# Patient Record
Sex: FEMALE | Race: WHITE | NOT HISPANIC OR LATINO | Employment: UNEMPLOYED | URBAN - METROPOLITAN AREA
[De-identification: names, ages, dates, MRNs, and addresses within clinical notes are randomized per-mention and may not be internally consistent; named-entity substitution may affect disease eponyms.]

---

## 2022-01-31 ENCOUNTER — HOSPITAL ENCOUNTER (EMERGENCY)
Facility: HOSPITAL | Age: 57
Discharge: HOME | End: 2022-01-31
Attending: EMERGENCY MEDICINE
Payer: MEDICAID

## 2022-01-31 VITALS
DIASTOLIC BLOOD PRESSURE: 89 MMHG | OXYGEN SATURATION: 96 % | WEIGHT: 224.31 LBS | RESPIRATION RATE: 16 BRPM | HEIGHT: 68 IN | TEMPERATURE: 98 F | BODY MASS INDEX: 34 KG/M2 | HEART RATE: 82 BPM | SYSTOLIC BLOOD PRESSURE: 160 MMHG

## 2022-01-31 DIAGNOSIS — K59.01 SLOW TRANSIT CONSTIPATION: ICD-10-CM

## 2022-01-31 DIAGNOSIS — R10.84 GENERALIZED ABDOMINAL PAIN: Primary | ICD-10-CM

## 2022-01-31 LAB
ALBUMIN SERPL BCP-MCNC: 4.4 G/DL (ref 3.5–5.2)
ALP SERPL-CCNC: 90 U/L (ref 55–135)
ALT SERPL W/O P-5'-P-CCNC: 12 U/L (ref 10–44)
ANION GAP SERPL CALC-SCNC: 14 MMOL/L (ref 8–16)
AST SERPL-CCNC: 19 U/L (ref 10–40)
BASOPHILS # BLD AUTO: 0.1 K/UL (ref 0–0.2)
BASOPHILS NFR BLD: 1.3 % (ref 0–1.9)
BILIRUB SERPL-MCNC: 0.5 MG/DL (ref 0.1–1)
BILIRUB UR QL STRIP: NEGATIVE
BUN SERPL-MCNC: 13 MG/DL (ref 6–20)
CALCIUM SERPL-MCNC: 9.8 MG/DL (ref 8.7–10.5)
CHLORIDE SERPL-SCNC: 106 MMOL/L (ref 95–110)
CLARITY UR REFRACT.AUTO: CLEAR
CO2 SERPL-SCNC: 20 MMOL/L (ref 23–29)
COLOR UR AUTO: YELLOW
CREAT SERPL-MCNC: 0.8 MG/DL (ref 0.5–1.4)
DIFFERENTIAL METHOD: ABNORMAL
EOSINOPHIL # BLD AUTO: 0.2 K/UL (ref 0–0.5)
EOSINOPHIL NFR BLD: 3.1 % (ref 0–8)
ERYTHROCYTE [DISTWIDTH] IN BLOOD BY AUTOMATED COUNT: 21.3 % (ref 11.5–14.5)
EST. GFR  (AFRICAN AMERICAN): >60 ML/MIN/1.73 M^2
EST. GFR  (NON AFRICAN AMERICAN): >60 ML/MIN/1.73 M^2
GLUCOSE SERPL-MCNC: 97 MG/DL (ref 70–110)
GLUCOSE UR QL STRIP: NEGATIVE
HCT VFR BLD AUTO: 37.8 % (ref 37–48.5)
HEP C VIRUS HOLD SPECIMEN: NORMAL
HGB BLD-MCNC: 10.2 G/DL (ref 12–16)
HGB UR QL STRIP: NEGATIVE
IMM GRANULOCYTES # BLD AUTO: 0.02 K/UL (ref 0–0.04)
IMM GRANULOCYTES NFR BLD AUTO: 0.3 % (ref 0–0.5)
KETONES UR QL STRIP: NEGATIVE
LEUKOCYTE ESTERASE UR QL STRIP: NEGATIVE
LIPASE SERPL-CCNC: 16 U/L (ref 4–60)
LYMPHOCYTES # BLD AUTO: 1.5 K/UL (ref 1–4.8)
LYMPHOCYTES NFR BLD: 19.4 % (ref 18–48)
MCH RBC QN AUTO: 16.7 PG (ref 27–31)
MCHC RBC AUTO-ENTMCNC: 27 G/DL (ref 32–36)
MCV RBC AUTO: 62 FL (ref 82–98)
MONOCYTES # BLD AUTO: 0.5 K/UL (ref 0.3–1)
MONOCYTES NFR BLD: 6.8 % (ref 4–15)
NEUTROPHILS # BLD AUTO: 5.3 K/UL (ref 1.8–7.7)
NEUTROPHILS NFR BLD: 69.1 % (ref 38–73)
NITRITE UR QL STRIP: NEGATIVE
NRBC BLD-RTO: 0 /100 WBC
PH UR STRIP: 5 [PH] (ref 5–8)
PLATELET # BLD AUTO: 230 K/UL (ref 150–450)
PMV BLD AUTO: ABNORMAL FL (ref 9.2–12.9)
POTASSIUM SERPL-SCNC: 3.8 MMOL/L (ref 3.5–5.1)
PROT SERPL-MCNC: 7.3 G/DL (ref 6–8.4)
PROT UR QL STRIP: NEGATIVE
RBC # BLD AUTO: 6.11 M/UL (ref 4–5.4)
SODIUM SERPL-SCNC: 140 MMOL/L (ref 136–145)
SP GR UR STRIP: <=1.005 (ref 1–1.03)
URN SPEC COLLECT METH UR: ABNORMAL
UROBILINOGEN UR STRIP-ACNC: NEGATIVE EU/DL
WBC # BLD AUTO: 7.68 K/UL (ref 3.9–12.7)

## 2022-01-31 PROCEDURE — 83690 ASSAY OF LIPASE: CPT | Mod: ER | Performed by: EMERGENCY MEDICINE

## 2022-01-31 PROCEDURE — 96374 THER/PROPH/DIAG INJ IV PUSH: CPT | Mod: 59,ER

## 2022-01-31 PROCEDURE — 80053 COMPREHEN METABOLIC PANEL: CPT | Mod: ER | Performed by: EMERGENCY MEDICINE

## 2022-01-31 PROCEDURE — 85025 COMPLETE CBC W/AUTO DIFF WBC: CPT | Mod: ER | Performed by: EMERGENCY MEDICINE

## 2022-01-31 PROCEDURE — 86803 HEPATITIS C AB TEST: CPT | Performed by: EMERGENCY MEDICINE

## 2022-01-31 PROCEDURE — 99285 EMERGENCY DEPT VISIT HI MDM: CPT | Mod: 25,ER

## 2022-01-31 PROCEDURE — 63600175 PHARM REV CODE 636 W HCPCS: Mod: ER | Performed by: EMERGENCY MEDICINE

## 2022-01-31 PROCEDURE — 25500020 PHARM REV CODE 255: Mod: ER | Performed by: EMERGENCY MEDICINE

## 2022-01-31 PROCEDURE — 81003 URINALYSIS AUTO W/O SCOPE: CPT | Mod: ER | Performed by: EMERGENCY MEDICINE

## 2022-01-31 PROCEDURE — 87389 HIV-1 AG W/HIV-1&-2 AB AG IA: CPT | Performed by: EMERGENCY MEDICINE

## 2022-01-31 PROCEDURE — 96361 HYDRATE IV INFUSION ADD-ON: CPT | Mod: ER

## 2022-01-31 RX ORDER — POLYETHYLENE GLYCOL 3350 17 G/17G
POWDER, FOR SOLUTION ORAL
COMMUNITY
End: 2022-04-19

## 2022-01-31 RX ORDER — HYOSCYAMINE SULFATE 0.12 MG/1
0.12 TABLET SUBLINGUAL EVERY 6 HOURS PRN
Qty: 12 TABLET | Refills: 0 | Status: SHIPPED | OUTPATIENT
Start: 2022-01-31 | End: 2024-03-11

## 2022-01-31 RX ORDER — ONDANSETRON 2 MG/ML
4 INJECTION INTRAMUSCULAR; INTRAVENOUS
Status: COMPLETED | OUTPATIENT
Start: 2022-01-31 | End: 2022-01-31

## 2022-01-31 RX ORDER — LACTULOSE 10 G/15ML
10 SOLUTION ORAL 2 TIMES DAILY
Qty: 60 ML | Refills: 2 | Status: SHIPPED | OUTPATIENT
Start: 2022-01-31 | End: 2022-02-04 | Stop reason: ALTCHOICE

## 2022-01-31 RX ORDER — SYRING-NEEDL,DISP,INSUL,0.3 ML 29 G X1/2"
296 SYRINGE, EMPTY DISPOSABLE MISCELLANEOUS ONCE
COMMUNITY
End: 2022-04-19

## 2022-01-31 RX ADMIN — ONDANSETRON 4 MG: 2 INJECTION INTRAMUSCULAR; INTRAVENOUS at 12:01

## 2022-01-31 RX ADMIN — IOHEXOL 75 ML: 350 INJECTION, SOLUTION INTRAVENOUS at 02:01

## 2022-01-31 RX ADMIN — SODIUM CHLORIDE, SODIUM LACTATE, POTASSIUM CHLORIDE, AND CALCIUM CHLORIDE 1000 ML: .6; .31; .03; .02 INJECTION, SOLUTION INTRAVENOUS at 12:01

## 2022-02-01 ENCOUNTER — PES CALL (OUTPATIENT)
Dept: ADMINISTRATIVE | Facility: CLINIC | Age: 57
End: 2022-02-01
Payer: MEDICAID

## 2022-02-01 LAB — HCV AB SERPL QL IA: NEGATIVE

## 2022-02-01 NOTE — ED PROVIDER NOTES
Encounter Date: 1/31/2022       History     Chief Complaint   Patient presents with    Constipation     Pt present to ED with concerns of being constipated, onset 1 week ago , worsen, pt has been taking OTC meds and enema with no relief      Chief complaint: Constipation    History of present illness: 57 y/o female with c/o constipation and generalized abdominal discomfort for 1 week. Severity increasing from mild to moderate over the last few days. No relief with multiple doses of Mag Citrate, Dulcolax. No rectal bleeding or dark stool . Previously had some green soft stool but no BRB. No mucus. No vomiting but some nausea. Enemas tried with no relief as well. No apparent aggravating or mitigating factors.        Review of patient's allergies indicates:  No Known Allergies  History reviewed. No pertinent past medical history.  Past Surgical History:   Procedure Laterality Date    TUBAL LIGATION       History reviewed. No pertinent family history.  Social History     Tobacco Use    Smoking status: Current Every Day Smoker     Types: Cigarettes    Smokeless tobacco: Never Used   Substance Use Topics    Alcohol use: Yes    Drug use: Not Currently     Types: Marijuana, Methamphetamines     Review of Systems   Constitutional: Negative for activity change, appetite change, chills and fever.   HENT: Negative.    Eyes: Negative.    Respiratory: Negative.  Negative for shortness of breath.    Cardiovascular: Negative.  Negative for leg swelling.   Gastrointestinal: Positive for abdominal pain, constipation and nausea. Negative for abdominal distention, blood in stool, diarrhea, rectal pain and vomiting.   Genitourinary: Negative.    Musculoskeletal: Negative.    Neurological: Negative.    Psychiatric/Behavioral: Negative.    All other systems reviewed and are negative.      Physical Exam     Initial Vitals [01/31/22 1102]   BP Pulse Resp Temp SpO2   (!) 160/89 82 18 98 °F (36.7 °C) 96 %      MAP       --          Physical Exam    Nursing note and vitals reviewed.  Constitutional: She appears well-nourished. She is not diaphoretic. No distress.   HENT:   Head: Normocephalic and atraumatic.   Right Ear: External ear normal.   Left Ear: External ear normal.   Mouth/Throat: Oropharynx is clear and moist.   Eyes: Conjunctivae and EOM are normal. No scleral icterus.   Neck: Neck supple.   Normal range of motion.  Cardiovascular: Normal rate, regular rhythm and intact distal pulses.   Pulmonary/Chest: No respiratory distress.   Abdominal: Abdomen is soft. She exhibits no distension. There is abdominal tenderness (Mild diffuse). There is guarding. There is no rebound.   Musculoskeletal:         General: Normal range of motion.      Cervical back: Normal range of motion and neck supple.     Neurological: She is alert and oriented to person, place, and time. She has normal strength. GCS score is 15. GCS eye subscore is 4. GCS verbal subscore is 5. GCS motor subscore is 6.   Skin: Skin is warm and dry.   Psychiatric: She has a normal mood and affect. Her behavior is normal. Judgment and thought content normal.         ED Course   Procedures  Labs Reviewed   CBC W/ AUTO DIFFERENTIAL - Abnormal; Notable for the following components:       Result Value    RBC 6.11 (*)     Hemoglobin 10.2 (*)     MCV 62 (*)     MCH 16.7 (*)     MCHC 27.0 (*)     RDW 21.3 (*)     All other components within normal limits   COMPREHENSIVE METABOLIC PANEL - Abnormal; Notable for the following components:    CO2 20 (*)     All other components within normal limits   URINALYSIS, REFLEX TO URINE CULTURE - Abnormal; Notable for the following components:    Specific Gravity, UA <=1.005 (*)     All other components within normal limits    Narrative:     Specimen Source->Urine   HEPATITIS C ANTIBODY    Narrative:     Release to patient->Immediate   HEP C VIRUS HOLD SPECIMEN    Narrative:     Release to patient->Immediate   LIPASE   HIV 1 / 2 ANTIBODY           Imaging Results          CT Abdomen Pelvis With Contrast (Final result)  Result time 01/31/22 14:10:39    Final result by Thaddeus Martin MD (01/31/22 14:10:39)                 Impression:      Few scattered nonspecific air-fluid levels.  Otherwise negative except for a simple hepatic cyst.      Electronically signed by: Thaddeus Martin MD  Date:    01/31/2022  Time:    14:10             Narrative:    EXAMINATION:  CT ABDOMEN PELVIS WITH CONTRAST    CLINICAL HISTORY:  Acute abdominal pain.    TECHNIQUE:  Standard contrast enhanced CT performed with IV 75 ml Omnipaque 350.  with reformats.  All CT scans at this facility are performed  using dose modulation techniques as appropriate to performed exam including the following:  automated exposure control; adjustment of mA and/or kV according to the patients size (this includes techniques or standardized protocols for targeted exams where dose is matched to indication/reason for exam: i.e. extremities or head);  iterative reconstruction technique.    COMPARISON:  None    FINDINGS:  Lung bases are clear.    The liver is nonenlarged.  There is at least 1 simple cyst.  The gallbladder is present.    The spleen is nonenlarged.  Small accessory splenule is present.    Kidneys and adrenal glands are normal.    The abdominal aorta, IVC and pancreas appear normal.    The bowel loops are nondilated.  There are however a few scattered air-fluid levels.  The appendix is normal.    Within the pelvis the uterus is present    Several calcifications are noted most consistent with phleboliths.    Mild lumbar degenerative disc disease notably at L5-S1.    No free fluid, masses or inflammation.                                 Medications   lactated ringers bolus 1,000 mL (0 mLs Intravenous Stopped 1/31/22 1348)   ondansetron injection 4 mg (4 mg Intravenous Given 1/31/22 1252)   iohexoL (OMNIPAQUE 350) injection 75 mL (75 mLs Intravenous Given 1/31/22 1400)     Medical Decision  Making:   Clinical Tests:   Lab Tests: Ordered and Reviewed  Radiological Study: Ordered and Reviewed  ED Management:  The patient has constipation unrelenting for a week. Linzess also part of regimen that has not worked over the period. Diffuse tenderness suggests investigating abdominal contents on CT to determine any degree of constipation or other obstructive pathology in the large bowel.  CT negative for SBO or stool over-load. Few air/fluid levels in large bowel of incidental finding.   SAdvised will try Latulose on prn basis next 48 hours but to return if symptoms worsen. Pt and partner understand and will comply if necessary.  Suggests slow transit as cause as opposed to excess stool burden.                      Clinical Impression:   Final diagnoses:  [R10.84] Generalized abdominal pain (Primary)  [K59.01] Slow transit constipation          ED Disposition Condition    Discharge Stable        ED Prescriptions     Medication Sig Dispense Start Date End Date Auth. Provider    lactulose (CHRONULAC) 20 gram/30 mL Soln Take 15 mLs (10 g total) by mouth 2 (two) times daily. For constipation. 60 mL 1/31/2022  Carlos Vasquez MD    hyoscyamine (LEVSIN/SL) 0.125 mg Subl Place 1 tablet (0.125 mg total) under the tongue every 6 (six) hours as needed (abdominal discomfort). 12 tablet 1/31/2022  Carlos Vasquez MD        Follow-up Information     Follow up With Specialties Details Why Contact Info    [facility] Emergency Dept Emergency Medicine  If symptoms worsen [address]  [facility] [id number] [phone]           Carlos Vasquez MD  02/01/22 0025

## 2022-02-02 ENCOUNTER — HOSPITAL ENCOUNTER (EMERGENCY)
Facility: HOSPITAL | Age: 57
Discharge: HOME | End: 2022-02-02
Attending: EMERGENCY MEDICINE
Payer: MEDICAID

## 2022-02-02 ENCOUNTER — NURSE TRIAGE (OUTPATIENT)
Dept: ADMINISTRATIVE | Facility: CLINIC | Age: 57
End: 2022-02-02
Payer: MEDICAID

## 2022-02-02 VITALS
TEMPERATURE: 98 F | WEIGHT: 227.06 LBS | HEART RATE: 66 BPM | BODY MASS INDEX: 34.53 KG/M2 | OXYGEN SATURATION: 98 % | SYSTOLIC BLOOD PRESSURE: 161 MMHG | RESPIRATION RATE: 18 BRPM | DIASTOLIC BLOOD PRESSURE: 79 MMHG

## 2022-02-02 DIAGNOSIS — K59.00 CONSTIPATION: ICD-10-CM

## 2022-02-02 LAB — HIV 1+2 AB+HIV1 P24 AG SERPL QL IA: NEGATIVE

## 2022-02-02 PROCEDURE — 99283 EMERGENCY DEPT VISIT LOW MDM: CPT | Mod: 25,ER

## 2022-02-02 NOTE — TELEPHONE ENCOUNTER
Has been 10 days without a BM. Seen in the ED on 1/31. Taking as ordered, Lactulose no results, stomach is rumbling. Prunes and fiber, apple juice, everything. No results. Patient is almost crying, very worried. Reassured but advised she would need to go back to the ED per her discharge instructions. Triage done- dispo ED.  Verb understanding. Instructed to call back for any further questions or concerns or worsening S/S.     Reason for Disposition   Patient sounds very sick or weak to the triager    Additional Information   Negative: [1] Abdomen pain is main symptom AND [2] male   Negative: [1] Abdomen pain is main symptom AND [2] adult female   Negative: Rectal bleeding or blood in stool is main symptom   Negative: Rectal pain or itching is main symptom   Negative: Constipation in a cancer patient who is currently (or recently) receiving chemotherapy or radiation therapy, or cancer patient who has metastatic or end-stage cancer and is receiving palliative care    Protocols used: CONSTIPATION-A-

## 2022-02-03 ENCOUNTER — TELEPHONE (OUTPATIENT)
Dept: GASTROENTEROLOGY | Facility: CLINIC | Age: 57
End: 2022-02-03
Payer: MEDICAID

## 2022-02-03 NOTE — TELEPHONE ENCOUNTER
----- Message from Manas Lucio sent at 2/3/2022  8:18 AM CST -----  Contact: self  Type:  Sooner Apoointment Request    Caller is requesting a sooner appointment.  Caller declined first available appointment listed below.  Caller will not accept being placed on the waitlist and is requesting a message be sent to doctor.  Name of Caller:Sheryldami Ortiz  When is the first available appointment?5/2/2022  Symptoms:er follow up  Would the patient rather a call back or a response via [facility]? Call back  Best Call Back Number:[phone]  Additional Information:

## 2022-02-03 NOTE — ED PROVIDER NOTES
Encounter Date: 2/2/2022       History     Chief Complaint   Patient presents with    Constipation     Last BM 12 days ago. Pt came into er and was prescribed lactulose and taken 5 doses with no relief. Lower abdominal discomfort. Pt was laying down ealier and felt the room spinning but that has resolved.      This is an ongoing problem  Chief complaint: Constipation    History of present illness: 57 y/o female with c/o continuing constipation in spite of multiple doses of Lactulose and other laxatives. Abdominal pain has not worsened since visit 2 days ago. Passed about 2 ounces of stool today. No blood or dark stool. No nausea or vomiting and is eating well and baffled as to why no stool. Symptoms about the same as 2 days ago. No aggravating or mitigating factors.        Review of patient's allergies indicates:  No Known Allergies  No past medical history on file.  Past Surgical History:   Procedure Laterality Date    TUBAL LIGATION       No family history on file.  Social History     Tobacco Use    Smoking status: Current Every Day Smoker     Types: Cigarettes    Smokeless tobacco: Never Used   Substance Use Topics    Alcohol use: Yes    Drug use: Not Currently     Types: Marijuana, Methamphetamines     Review of Systems   Constitutional: Negative for activity change, appetite change, fatigue and fever.   HENT: Negative.    Respiratory: Negative.    Gastrointestinal: Positive for abdominal pain and constipation. Negative for abdominal distention, anal bleeding, diarrhea, nausea, rectal pain and vomiting.   Genitourinary: Negative.    Musculoskeletal: Negative.    Neurological: Negative.    Psychiatric/Behavioral: Negative.    All other systems reviewed and are negative.      Physical Exam     Initial Vitals [02/02/22 1909]   BP Pulse Resp Temp SpO2   (!) 169/70 70 18 97.8 °F (36.6 °C) 98 %      MAP       --         Physical Exam    Nursing note and vitals reviewed.  Constitutional: She appears  well-nourished. No distress.   HENT:   Head: Normocephalic and atraumatic.   Right Ear: External ear normal.   Left Ear: External ear normal.   Eyes: Conjunctivae and EOM are normal.   Neck: Neck supple.   Normal range of motion.  Cardiovascular: Normal rate, regular rhythm and intact distal pulses.   Pulmonary/Chest: No respiratory distress.   Abdominal: Abdomen is soft. She exhibits no distension. There is abdominal tenderness (Mild LUQ).   Rectal : Normal sphincter tone (Chaperone present), no fissures or hemorrhoids noted   Musculoskeletal:         General: Normal range of motion.      Cervical back: Normal range of motion and neck supple.     Neurological: She is alert and oriented to person, place, and time. A cranial nerve deficit is present.   Skin: Capillary refill takes less than 2 seconds.   Psychiatric: She has a normal mood and affect. Her behavior is normal.         ED Course   Procedures  Labs Reviewed - No data to display       Imaging Results          X-Ray Abdomen Flat And Erect (Final result)  Result time 02/02/22 21:11:06    Final result by Gunnar Peterson MD (02/02/22 21:11:06)                 Impression:      As above      Electronically signed by: Nicholas Maher  Date:    02/02/2022  Time:    21:11             Narrative:    EXAMINATION:  XR ABDOMEN FLAT AND ERECT    CLINICAL HISTORY:  Constipation, unspecified    TECHNIQUE:  Flat and erect AP views of the abdomen were performed.    COMPARISON:  None    FINDINGS:  Moderate amount of stool in the colon.  No air-fluid levels or distended loops of bowel.  No acute process seen                                   Medications - No data to display  Medical Decision Making:   Clinical Tests:   Radiological Study: Ordered and Reviewed  ED Management:  Flat and erect suggest a moderate stool load but no signs of obstruction. Rectal digital exam is unremarkable but no impaction noted. No signs of acute abdomen clinically or radiologically. Advised to take  enema tonight and be prepared for call from GI clinic, per Dr. Juárez, for further evaluation of the chronic constipation.  Other:   I have discussed this case with another health care provider.       <> Summary of the Discussion: 8:45 p.m. Discussed with Dr. Juárez, GI and will arrange for outpatient evaluation.                      Clinical Impression:        ICD-10-CM ICD-9-CM   1. Constipation  K59.00 564.00               Carlos Vasquez MD  02/03/22 0159

## 2022-02-04 ENCOUNTER — LAB VISIT (OUTPATIENT)
Dept: LAB | Facility: HOSPITAL | Age: 57
End: 2022-02-04
Attending: PHYSICIAN ASSISTANT
Payer: MEDICAID

## 2022-02-04 ENCOUNTER — OFFICE VISIT (OUTPATIENT)
Dept: GASTROENTEROLOGY | Facility: CLINIC | Age: 57
End: 2022-02-04
Payer: MEDICAID

## 2022-02-04 VITALS
SYSTOLIC BLOOD PRESSURE: 126 MMHG | DIASTOLIC BLOOD PRESSURE: 78 MMHG | HEIGHT: 68 IN | HEART RATE: 96 BPM | BODY MASS INDEX: 34.32 KG/M2 | WEIGHT: 226.44 LBS | OXYGEN SATURATION: 99 %

## 2022-02-04 DIAGNOSIS — D64.9 ANEMIA, UNSPECIFIED TYPE: ICD-10-CM

## 2022-02-04 DIAGNOSIS — K59.00 CONSTIPATION, UNSPECIFIED CONSTIPATION TYPE: ICD-10-CM

## 2022-02-04 DIAGNOSIS — R10.12 LUQ PAIN: ICD-10-CM

## 2022-02-04 DIAGNOSIS — K92.1 BLACK STOOLS: Primary | ICD-10-CM

## 2022-02-04 DIAGNOSIS — K92.1 BLACK STOOLS: ICD-10-CM

## 2022-02-04 LAB
BASOPHILS # BLD AUTO: 0.1 K/UL (ref 0–0.2)
BASOPHILS NFR BLD: 1.6 % (ref 0–1.9)
DIFFERENTIAL METHOD: ABNORMAL
EOSINOPHIL # BLD AUTO: 0.4 K/UL (ref 0–0.5)
EOSINOPHIL NFR BLD: 6.9 % (ref 0–8)
ERYTHROCYTE [DISTWIDTH] IN BLOOD BY AUTOMATED COUNT: 21.9 % (ref 11.5–14.5)
HCT VFR BLD AUTO: 37.5 % (ref 37–48.5)
HGB BLD-MCNC: 9.7 G/DL (ref 12–16)
IMM GRANULOCYTES # BLD AUTO: 0.02 K/UL (ref 0–0.04)
IMM GRANULOCYTES NFR BLD AUTO: 0.3 % (ref 0–0.5)
LYMPHOCYTES # BLD AUTO: 1.7 K/UL (ref 1–4.8)
LYMPHOCYTES NFR BLD: 26.8 % (ref 18–48)
MCH RBC QN AUTO: 16.8 PG (ref 27–31)
MCHC RBC AUTO-ENTMCNC: 25.9 G/DL (ref 32–36)
MCV RBC AUTO: 65 FL (ref 82–98)
MONOCYTES # BLD AUTO: 0.5 K/UL (ref 0.3–1)
MONOCYTES NFR BLD: 7.7 % (ref 4–15)
NEUTROPHILS # BLD AUTO: 3.6 K/UL (ref 1.8–7.7)
NEUTROPHILS NFR BLD: 56.7 % (ref 38–73)
NRBC BLD-RTO: 0 /100 WBC
PLATELET # BLD AUTO: 224 K/UL (ref 150–450)
PMV BLD AUTO: ABNORMAL FL (ref 9.2–12.9)
RBC # BLD AUTO: 5.78 M/UL (ref 4–5.4)
WBC # BLD AUTO: 6.34 K/UL (ref 3.9–12.7)

## 2022-02-04 PROCEDURE — 99204 OFFICE O/P NEW MOD 45 MIN: CPT | Mod: S$PBB,,, | Performed by: PHYSICIAN ASSISTANT

## 2022-02-04 PROCEDURE — 3008F PR BODY MASS INDEX (BMI) DOCUMENTED: ICD-10-PCS | Mod: CPTII,,, | Performed by: PHYSICIAN ASSISTANT

## 2022-02-04 PROCEDURE — 3078F DIAST BP <80 MM HG: CPT | Mod: CPTII,,, | Performed by: PHYSICIAN ASSISTANT

## 2022-02-04 PROCEDURE — 85025 COMPLETE CBC W/AUTO DIFF WBC: CPT | Performed by: PHYSICIAN ASSISTANT

## 2022-02-04 PROCEDURE — 1159F PR MEDICATION LIST DOCUMENTED IN MEDICAL RECORD: ICD-10-PCS | Mod: CPTII,,, | Performed by: PHYSICIAN ASSISTANT

## 2022-02-04 PROCEDURE — 3008F BODY MASS INDEX DOCD: CPT | Mod: CPTII,,, | Performed by: PHYSICIAN ASSISTANT

## 2022-02-04 PROCEDURE — 99999 PR PBB SHADOW E&M-EST. PATIENT-LVL III: ICD-10-PCS | Mod: PBBFAC,,, | Performed by: PHYSICIAN ASSISTANT

## 2022-02-04 PROCEDURE — 36415 COLL VENOUS BLD VENIPUNCTURE: CPT | Performed by: PHYSICIAN ASSISTANT

## 2022-02-04 PROCEDURE — 99213 OFFICE O/P EST LOW 20 MIN: CPT | Mod: PBBFAC | Performed by: PHYSICIAN ASSISTANT

## 2022-02-04 PROCEDURE — 3074F SYST BP LT 130 MM HG: CPT | Mod: CPTII,,, | Performed by: PHYSICIAN ASSISTANT

## 2022-02-04 PROCEDURE — 99204 PR OFFICE/OUTPT VISIT, NEW, LEVL IV, 45-59 MIN: ICD-10-PCS | Mod: S$PBB,,, | Performed by: PHYSICIAN ASSISTANT

## 2022-02-04 PROCEDURE — 99999 PR PBB SHADOW E&M-EST. PATIENT-LVL III: CPT | Mod: PBBFAC,,, | Performed by: PHYSICIAN ASSISTANT

## 2022-02-04 PROCEDURE — 1159F MED LIST DOCD IN RCRD: CPT | Mod: CPTII,,, | Performed by: PHYSICIAN ASSISTANT

## 2022-02-04 PROCEDURE — 3078F PR MOST RECENT DIASTOLIC BLOOD PRESSURE < 80 MM HG: ICD-10-PCS | Mod: CPTII,,, | Performed by: PHYSICIAN ASSISTANT

## 2022-02-04 PROCEDURE — 3074F PR MOST RECENT SYSTOLIC BLOOD PRESSURE < 130 MM HG: ICD-10-PCS | Mod: CPTII,,, | Performed by: PHYSICIAN ASSISTANT

## 2022-02-04 RX ORDER — SOD SULF/POT CHLORIDE/MAG SULF 1.479 G
12 TABLET ORAL DAILY
Qty: 24 TABLET | Refills: 0 | Status: SHIPPED | OUTPATIENT
Start: 2022-02-04 | End: 2022-04-19

## 2022-02-04 RX ORDER — PANTOPRAZOLE SODIUM 40 MG/1
40 TABLET, DELAYED RELEASE ORAL DAILY
Qty: 30 TABLET | Refills: 11 | Status: SHIPPED | OUTPATIENT
Start: 2022-02-04 | End: 2022-02-28 | Stop reason: SDUPTHER

## 2022-02-04 NOTE — PROGRESS NOTES
Clinic Consult:  [facility] Consultation Note    Reason for Consult:  The primary encounter diagnosis was Black stools. Diagnoses of LUQ pain, Constipation, unspecified constipation type, and Anemia, unspecified type were also pertinent to this visit.    PCP: Primary Doctor No   [id number] / [address] [id number]    CC: Constipation      HPI:  This is a 56 y.o. female here for evaluation of the above. Reports she began with constipation in the middle of January. She was tried taking Miralax, dulcolax, Linzess, and Magnesium citrate with no movement. Reported to the ED and was given Lactulose and Levsin. Reported back to the ED a few days ago for same symptoms. She finally got some bowel movement with enema and dulcolax yesterday. Denies any blood in the stool. Main complaint is pain to LUQ deep under her ribs. Worse with eating, especially meat. Notes black stools for a few days in a row a couple of weeks ago. Labs in the ED showed an anemia with a hemoglobin of 10.2 but no additional labs to compare this to. CT completed with no acute abnormalities.  Mother with colitis history - no CRC.  Not taking iron or pepto bismol.      Review of Systems   Constitutional: Negative for activity change, appetite change, chills, diaphoresis, fatigue, fever and unexpected weight change.   HENT: Negative for trouble swallowing.    Respiratory: Negative for cough and shortness of breath.    Cardiovascular: Negative for chest pain.   Gastrointestinal: Positive for abdominal pain and constipation. Negative for abdominal distention, anal bleeding, blood in stool, nausea and vomiting.   Genitourinary: Negative for dysuria and hematuria.   Skin: Negative for color change and pallor.   Neurological: Negative for dizziness, weakness and light-headedness.   Psychiatric/Behavioral: Negative for dysphoric mood. The patient is nervous/anxious.         Medical History:   No past medical history on file.    Surgical  "History:   Past Surgical History:   Procedure Laterality Date    TUBAL LIGATION         Family History:    No family history on file.    Social History:   Social History     Socioeconomic History    Marital status:    Tobacco Use    Smoking status: Current Every Day Smoker     Types: Cigarettes    Smokeless tobacco: Never Used   Substance and Sexual Activity    Alcohol use: Not Currently    Drug use: Not Currently     Types: Marijuana, Methamphetamines    Sexual activity: Not Currently       Allergies:   Review of patient's allergies indicates:  No Known Allergies    Home Medications:   Current Outpatient Medications on File Prior to Visit   Medication Sig Dispense Refill    hyoscyamine (LEVSIN/SL) 0.125 mg Subl Place 1 tablet (0.125 mg total) under the tongue every 6 (six) hours as needed (abdominal discomfort). 12 tablet 0    magnesium citrate solution Take 296 mLs by mouth once.      polyethylene glycol (GLYCOLAX) 17 gram PwPk Take by mouth.      [DISCONTINUED] lactulose (CHRONULAC) 20 gram/30 mL Soln Take 15 mLs (10 g total) by mouth 2 (two) times daily. For constipation. (Patient not taking: Reported on 2/4/2022) 60 mL 2    [DISCONTINUED] linaclotide (LINZESS ORAL) Take by mouth.       No current facility-administered medications on file prior to visit.       Physical Exam:  /78   Pulse 96   Ht 5' 8" (1.727 m)   Wt 102.7 kg (226 lb 6.6 oz)   LMP  (LMP Unknown)   SpO2 99%   BMI 34.43 kg/m²   Body mass index is 34.43 kg/m².  Physical Exam  Constitutional:       General: She is not in acute distress.     Appearance: Normal appearance. She is not ill-appearing, toxic-appearing or diaphoretic.   HENT:      Head: Normocephalic and atraumatic.   Eyes:      General: No scleral icterus.     Extraocular Movements: Extraocular movements intact.   Cardiovascular:      Rate and Rhythm: Normal rate and regular rhythm.   Pulmonary:      Effort: Pulmonary effort is normal. No respiratory " distress.   Abdominal:      General: Bowel sounds are normal. There is no distension.      Palpations: Abdomen is soft. There is no mass.      Tenderness: There is abdominal tenderness (LUQ). There is no guarding.   Musculoskeletal:         General: Normal range of motion.      Cervical back: Normal range of motion.   Skin:     General: Skin is warm and dry.      Coloration: Skin is not jaundiced or pale.   Neurological:      General: No focal deficit present.      Mental Status: She is alert and oriented to person, place, and time.   Psychiatric:         Mood and Affect: Mood normal.         Behavior: Behavior normal.         Thought Content: Thought content normal.           Labs: Pertinent labs reviewed.  Endoscopy: none  CRC Screening: none  Anticoagulation: none    Assessment:  1. Black stools    2. LUQ pain    3. Constipation, unspecified constipation type    4. Anemia, unspecified type         Recommendations:  -Schedule for colonoscopy (add EGD due to LUQ pain and black stools in addition to anemia).   -CBC today   -Would like for her to start on Linzess 145mcg daily. May need to increase to 290.  -Extended prep for colonoscopy in Wrap Up.  -SuTab    Black stools  -     pantoprazole (PROTONIX) 40 MG tablet; Take 1 tablet (40 mg total) by mouth once daily.  Dispense: 30 tablet; Refill: 11  -     CBC Auto Differential; Future; Expected date: 02/04/2022    LUQ pain  -     pantoprazole (PROTONIX) 40 MG tablet; Take 1 tablet (40 mg total) by mouth once daily.  Dispense: 30 tablet; Refill: 11  -     linaCLOtide (LINZESS) 145 mcg Cap capsule; Take 1 capsule (145 mcg total) by mouth before breakfast.  Dispense: 30 capsule; Refill: 2    Constipation, unspecified constipation type  -     Case Request Endoscopy: COLONOSCOPY  -     sod sulf-pot chloride-mag sulf (SUTAB) 1.479-0.188- 0.225 gram tablet; Take 12 tablets by mouth once daily. Take according to package instructions with indicated amount of water.  Dispense:  24 tablet; Refill: 0  -     linaCLOtide (LINZESS) 145 mcg Cap capsule; Take 1 capsule (145 mcg total) by mouth before breakfast.  Dispense: 30 capsule; Refill: 2    Anemia, unspecified type  -     Case Request Endoscopy: COLONOSCOPY  -     sod sulf-pot chloride-mag sulf (SUTAB) 1.479-0.188- 0.225 gram tablet; Take 12 tablets by mouth once daily. Take according to package instructions with indicated amount of water.  Dispense: 24 tablet; Refill: 0        No follow-ups on file.    Thank you so much for allowing me to participate in the care of [facility]  Roxi Sanchez PA-C

## 2022-02-04 NOTE — PATIENT INSTRUCTIONS
Extended prep for colonoscopy:  -Miralax daily starting 1 week before colonoscopy.  -Clear liquid diet for 2 days prior to colonoscopy  -one enema and a bottle of magnesium citrate 2 days prior to colonoscopy  -Normal prep starting 1 day prior to colonoscopy.

## 2022-02-04 NOTE — H&P (VIEW-ONLY)
Clinic Consult:  [facility] Consultation Note    Reason for Consult:  The primary encounter diagnosis was Black stools. Diagnoses of LUQ pain, Constipation, unspecified constipation type, and Anemia, unspecified type were also pertinent to this visit.    PCP: Primary Doctor No   [id number] / [address] [id number]    CC: Constipation      HPI:  This is a 56 y.o. female here for evaluation of the above. Reports she began with constipation in the middle of January. She was tried taking Miralax, dulcolax, Linzess, and Magnesium citrate with no movement. Reported to the ED and was given Lactulose and Levsin. Reported back to the ED a few days ago for same symptoms. She finally got some bowel movement with enema and dulcolax yesterday. Denies any blood in the stool. Main complaint is pain to LUQ deep under her ribs. Worse with eating, especially meat. Notes black stools for a few days in a row a couple of weeks ago. Labs in the ED showed an anemia with a hemoglobin of 10.2 but no additional labs to compare this to. CT completed with no acute abnormalities.  Mother with colitis history - no CRC.  Not taking iron or pepto bismol.      Review of Systems   Constitutional: Negative for activity change, appetite change, chills, diaphoresis, fatigue, fever and unexpected weight change.   HENT: Negative for trouble swallowing.    Respiratory: Negative for cough and shortness of breath.    Cardiovascular: Negative for chest pain.   Gastrointestinal: Positive for abdominal pain and constipation. Negative for abdominal distention, anal bleeding, blood in stool, nausea and vomiting.   Genitourinary: Negative for dysuria and hematuria.   Skin: Negative for color change and pallor.   Neurological: Negative for dizziness, weakness and light-headedness.   Psychiatric/Behavioral: Negative for dysphoric mood. The patient is nervous/anxious.         Medical History:   No past medical history on file.    Surgical  "History:   Past Surgical History:   Procedure Laterality Date    TUBAL LIGATION         Family History:    No family history on file.    Social History:   Social History     Socioeconomic History    Marital status:    Tobacco Use    Smoking status: Current Every Day Smoker     Types: Cigarettes    Smokeless tobacco: Never Used   Substance and Sexual Activity    Alcohol use: Not Currently    Drug use: Not Currently     Types: Marijuana, Methamphetamines    Sexual activity: Not Currently       Allergies:   Review of patient's allergies indicates:  No Known Allergies    Home Medications:   Current Outpatient Medications on File Prior to Visit   Medication Sig Dispense Refill    hyoscyamine (LEVSIN/SL) 0.125 mg Subl Place 1 tablet (0.125 mg total) under the tongue every 6 (six) hours as needed (abdominal discomfort). 12 tablet 0    magnesium citrate solution Take 296 mLs by mouth once.      polyethylene glycol (GLYCOLAX) 17 gram PwPk Take by mouth.      [DISCONTINUED] lactulose (CHRONULAC) 20 gram/30 mL Soln Take 15 mLs (10 g total) by mouth 2 (two) times daily. For constipation. (Patient not taking: Reported on 2/4/2022) 60 mL 2    [DISCONTINUED] linaclotide (LINZESS ORAL) Take by mouth.       No current facility-administered medications on file prior to visit.       Physical Exam:  /78   Pulse 96   Ht 5' 8" (1.727 m)   Wt 102.7 kg (226 lb 6.6 oz)   LMP  (LMP Unknown)   SpO2 99%   BMI 34.43 kg/m²   Body mass index is 34.43 kg/m².  Physical Exam  Constitutional:       General: She is not in acute distress.     Appearance: Normal appearance. She is not ill-appearing, toxic-appearing or diaphoretic.   HENT:      Head: Normocephalic and atraumatic.   Eyes:      General: No scleral icterus.     Extraocular Movements: Extraocular movements intact.   Cardiovascular:      Rate and Rhythm: Normal rate and regular rhythm.   Pulmonary:      Effort: Pulmonary effort is normal. No respiratory " distress.   Abdominal:      General: Bowel sounds are normal. There is no distension.      Palpations: Abdomen is soft. There is no mass.      Tenderness: There is abdominal tenderness (LUQ). There is no guarding.   Musculoskeletal:         General: Normal range of motion.      Cervical back: Normal range of motion.   Skin:     General: Skin is warm and dry.      Coloration: Skin is not jaundiced or pale.   Neurological:      General: No focal deficit present.      Mental Status: She is alert and oriented to person, place, and time.   Psychiatric:         Mood and Affect: Mood normal.         Behavior: Behavior normal.         Thought Content: Thought content normal.           Labs: Pertinent labs reviewed.  Endoscopy: none  CRC Screening: none  Anticoagulation: none    Assessment:  1. Black stools    2. LUQ pain    3. Constipation, unspecified constipation type    4. Anemia, unspecified type         Recommendations:  -Schedule for colonoscopy (add EGD due to LUQ pain and black stools in addition to anemia).   -CBC today   -Would like for her to start on Linzess 145mcg daily. May need to increase to 290.  -Extended prep for colonoscopy in Wrap Up.  -SuTab    Black stools  -     pantoprazole (PROTONIX) 40 MG tablet; Take 1 tablet (40 mg total) by mouth once daily.  Dispense: 30 tablet; Refill: 11  -     CBC Auto Differential; Future; Expected date: 02/04/2022    LUQ pain  -     pantoprazole (PROTONIX) 40 MG tablet; Take 1 tablet (40 mg total) by mouth once daily.  Dispense: 30 tablet; Refill: 11  -     linaCLOtide (LINZESS) 145 mcg Cap capsule; Take 1 capsule (145 mcg total) by mouth before breakfast.  Dispense: 30 capsule; Refill: 2    Constipation, unspecified constipation type  -     Case Request Endoscopy: COLONOSCOPY  -     sod sulf-pot chloride-mag sulf (SUTAB) 1.479-0.188- 0.225 gram tablet; Take 12 tablets by mouth once daily. Take according to package instructions with indicated amount of water.  Dispense:  24 tablet; Refill: 0  -     linaCLOtide (LINZESS) 145 mcg Cap capsule; Take 1 capsule (145 mcg total) by mouth before breakfast.  Dispense: 30 capsule; Refill: 2    Anemia, unspecified type  -     Case Request Endoscopy: COLONOSCOPY  -     sod sulf-pot chloride-mag sulf (SUTAB) 1.479-0.188- 0.225 gram tablet; Take 12 tablets by mouth once daily. Take according to package instructions with indicated amount of water.  Dispense: 24 tablet; Refill: 0        No follow-ups on file.    Thank you so much for allowing me to participate in the care of [facility]  Roxi Sanchez PA-C

## 2022-02-15 ENCOUNTER — PATIENT MESSAGE (OUTPATIENT)
Dept: GASTROENTEROLOGY | Facility: CLINIC | Age: 57
End: 2022-02-15
Payer: MEDICAID

## 2022-02-15 DIAGNOSIS — K59.00 CONSTIPATION, UNSPECIFIED CONSTIPATION TYPE: Primary | ICD-10-CM

## 2022-02-16 ENCOUNTER — PATIENT MESSAGE (OUTPATIENT)
Dept: GASTROENTEROLOGY | Facility: CLINIC | Age: 57
End: 2022-02-16
Payer: MEDICAID

## 2022-02-16 RX ORDER — LACTULOSE 10 G/15ML
20 SOLUTION ORAL 2 TIMES DAILY
Qty: 1800 ML | Refills: 2 | Status: SHIPPED | OUTPATIENT
Start: 2022-02-16 | End: 2022-04-19

## 2022-02-21 ENCOUNTER — TELEPHONE (OUTPATIENT)
Dept: PREADMISSION TESTING | Facility: HOSPITAL | Age: 57
End: 2022-02-21
Payer: MEDICAID

## 2022-02-21 NOTE — PRE-PROCEDURE INSTRUCTIONS
PAT call completed.  Patient educated on procedure instructions.  Medical history discussed and patient informed of arrival time of 11:30 AM on Wednesday, February 23, 2022 at the [facility], and was made aware of the limited-visitor policy, and that  is to remain during the entire visit.  All questions and concerns addressed.  Endoscopy instructions reviewed. Instructed no solid food, only clear liquids, the day before the procedure, then at 6 PM, the day before the procedure, drink the first half of the bowel prep, then at 6 AM, the morning of procedure, drink the second half of the prep, then do not eat or drink anything until after the procedure.  Rapid pre-procedure covid testing to be performed the morning of procedure.   Patient verbalized understanding of all instructions.

## 2022-02-21 NOTE — TELEPHONE ENCOUNTER
PAT call completed.  Patient educated on procedure instructions.  Medical history discussed and patient informed of arrival time of 11:30 AM on Wednesday, February 23, 2022 at the [facility], and was made aware of the limited-visitor policy, and that  is to remain during the entire visit.  All questions and concerns addressed.  Endoscopy instructions reviewed. Instructed no solid food, only clear liquids, the day before the procedure, then at 6 PM, the day before the procedure, drink the first half of the bowel prep, then at 6 AM, the morning of procedure, drink the second half of the prep, then do not eat or drink anything until after the procedure.  Rapid pre-procedure covid testing to be performed the morning of procedure.   Patient verbalized understanding of all instructions.

## 2022-02-22 ENCOUNTER — ANESTHESIA EVENT (OUTPATIENT)
Dept: ENDOSCOPY | Facility: HOSPITAL | Age: 57
End: 2022-02-22
Payer: MEDICAID

## 2022-02-22 NOTE — ANESTHESIA PREPROCEDURE EVALUATION
02/22/2022  Sheryl Ortiz is a 56 y.o., female.History reviewed. No pertinent past medical history.  Past Surgical History:   Procedure Laterality Date    TUBAL LIGATION             Pre-op Assessment    I have reviewed the Patient Summary Reports.     I have reviewed the Nursing Notes. I have reviewed the NPO Status.   I have reviewed the Medications.     Review of Systems  Anesthesia Hx:  No problems with previous Anesthesia  Denies Family Hx of Anesthesia complications.   Denies Personal Hx of Anesthesia complications.   Social:  Smoker, No Alcohol Use    Hematology/Oncology:     Oncology Normal    -- Anemia:   EENT/Dental:EENT/Dental Normal   Cardiovascular:  Cardiovascular Normal  Denies MI.  Denies CAD.     Denies Angina.    Renal/:  Renal/ Normal     Hepatic/GI:   Bowel Prep. Constipation, black stools, left upper quad pain    Musculoskeletal:  Musculoskeletal Normal    Neurological:  Neurology Normal    Endocrine:  Metabolic Disorders, Obesity / BMI > 30  Dermatological:  Skin Normal    Psych:  Psychiatric Normal           Physical Exam  General: Well nourished    Airway:  Mallampati: II   Mouth Opening: Normal  TM Distance: Normal  Tongue: Normal  Neck ROM: Normal ROM    Dental:  Intact        Anesthesia Plan  Type of Anesthesia, risks & benefits discussed:    Anesthesia Type: Gen Natural Airway  Intra-op Monitoring Plan: Standard ASA Monitors  Induction:  IV  Informed Consent: Informed consent signed with the Patient and all parties understand the risks and agree with anesthesia plan.  All questions answered.   ASA Score: 2  Day of Surgery Review of History & Physical: H&P Update referred to the surgeon/provider.    Ready For Surgery From Anesthesia Perspective.     .

## 2022-02-23 ENCOUNTER — HOSPITAL ENCOUNTER (OUTPATIENT)
Facility: HOSPITAL | Age: 57
Discharge: HOME | End: 2022-02-23
Attending: INTERNAL MEDICINE | Admitting: INTERNAL MEDICINE
Payer: MEDICAID

## 2022-02-23 ENCOUNTER — ANESTHESIA (OUTPATIENT)
Dept: ENDOSCOPY | Facility: HOSPITAL | Age: 57
End: 2022-02-23
Payer: MEDICAID

## 2022-02-23 VITALS
TEMPERATURE: 98 F | WEIGHT: 220.13 LBS | BODY MASS INDEX: 33.36 KG/M2 | HEIGHT: 68 IN | HEART RATE: 69 BPM | SYSTOLIC BLOOD PRESSURE: 131 MMHG | OXYGEN SATURATION: 97 % | RESPIRATION RATE: 16 BRPM | DIASTOLIC BLOOD PRESSURE: 81 MMHG

## 2022-02-23 DIAGNOSIS — K92.1 MELENA: Primary | ICD-10-CM

## 2022-02-23 LAB
CTP QC/QA: YES
SARS-COV-2 AG RESP QL IA.RAPID: NEGATIVE

## 2022-02-23 PROCEDURE — 88305 TISSUE EXAM BY PATHOLOGIST: CPT | Performed by: PATHOLOGY

## 2022-02-23 PROCEDURE — 63600175 PHARM REV CODE 636 W HCPCS: Performed by: INTERNAL MEDICINE

## 2022-02-23 PROCEDURE — 43239 EGD BIOPSY SINGLE/MULTIPLE: CPT | Mod: ,,, | Performed by: INTERNAL MEDICINE

## 2022-02-23 PROCEDURE — 45378 PR COLONOSCOPY,DIAGNOSTIC: ICD-10-PCS | Mod: ,,, | Performed by: INTERNAL MEDICINE

## 2022-02-23 PROCEDURE — D9220A PRA ANESTHESIA: ICD-10-PCS | Mod: ANES,,, | Performed by: ANESTHESIOLOGY

## 2022-02-23 PROCEDURE — 00813 ANES UPR LWR GI NDSC PX: CPT | Performed by: INTERNAL MEDICINE

## 2022-02-23 PROCEDURE — 45378 DIAGNOSTIC COLONOSCOPY: CPT | Mod: ,,, | Performed by: INTERNAL MEDICINE

## 2022-02-23 PROCEDURE — 63600175 PHARM REV CODE 636 W HCPCS: Performed by: NURSE ANESTHETIST, CERTIFIED REGISTERED

## 2022-02-23 PROCEDURE — 25000003 PHARM REV CODE 250: Performed by: NURSE ANESTHETIST, CERTIFIED REGISTERED

## 2022-02-23 PROCEDURE — D9220A PRA ANESTHESIA: ICD-10-PCS | Mod: CRNA,,, | Performed by: NURSE ANESTHETIST, CERTIFIED REGISTERED

## 2022-02-23 PROCEDURE — 88341 PR IHC OR ICC EACH ADD'L SINGLE ANTIBODY  STAINPR: ICD-10-PCS | Mod: 26,,, | Performed by: PATHOLOGY

## 2022-02-23 PROCEDURE — 43239 PR EGD, FLEX, W/BIOPSY, SGL/MULTI: ICD-10-PCS | Mod: ,,, | Performed by: INTERNAL MEDICINE

## 2022-02-23 PROCEDURE — D9220A PRA ANESTHESIA: Mod: CRNA,,, | Performed by: NURSE ANESTHETIST, CERTIFIED REGISTERED

## 2022-02-23 PROCEDURE — 88341 IMHCHEM/IMCYTCHM EA ADD ANTB: CPT | Mod: 26,,, | Performed by: PATHOLOGY

## 2022-02-23 PROCEDURE — 88342 IMHCHEM/IMCYTCHM 1ST ANTB: CPT | Mod: 59 | Performed by: PATHOLOGY

## 2022-02-23 PROCEDURE — 43239 EGD BIOPSY SINGLE/MULTIPLE: CPT | Performed by: INTERNAL MEDICINE

## 2022-02-23 PROCEDURE — 45378 DIAGNOSTIC COLONOSCOPY: CPT | Performed by: INTERNAL MEDICINE

## 2022-02-23 PROCEDURE — 37000009 HC ANESTHESIA EA ADD 15 MINS: Performed by: INTERNAL MEDICINE

## 2022-02-23 PROCEDURE — 88305 TISSUE EXAM BY PATHOLOGIST: CPT | Mod: 26,,, | Performed by: PATHOLOGY

## 2022-02-23 PROCEDURE — 27201012 HC FORCEPS, HOT/COLD, DISP: Performed by: INTERNAL MEDICINE

## 2022-02-23 PROCEDURE — 37000008 HC ANESTHESIA 1ST 15 MINUTES: Performed by: INTERNAL MEDICINE

## 2022-02-23 PROCEDURE — D9220A PRA ANESTHESIA: Mod: ANES,,, | Performed by: ANESTHESIOLOGY

## 2022-02-23 PROCEDURE — 88341 IMHCHEM/IMCYTCHM EA ADD ANTB: CPT | Performed by: PATHOLOGY

## 2022-02-23 PROCEDURE — 88342 IMHCHEM/IMCYTCHM 1ST ANTB: CPT | Mod: 26,,, | Performed by: PATHOLOGY

## 2022-02-23 PROCEDURE — 88342 CHG IMMUNOCYTOCHEMISTRY: ICD-10-PCS | Mod: 26,,, | Performed by: PATHOLOGY

## 2022-02-23 PROCEDURE — 88305 TISSUE EXAM BY PATHOLOGIST: ICD-10-PCS | Mod: 26,,, | Performed by: PATHOLOGY

## 2022-02-23 RX ORDER — PROPOFOL 10 MG/ML
VIAL (ML) INTRAVENOUS
Status: DISCONTINUED | OUTPATIENT
Start: 2022-02-23 | End: 2022-02-23

## 2022-02-23 RX ORDER — FENTANYL CITRATE 50 UG/ML
INJECTION, SOLUTION INTRAMUSCULAR; INTRAVENOUS
Status: DISCONTINUED | OUTPATIENT
Start: 2022-02-23 | End: 2022-02-23

## 2022-02-23 RX ORDER — LIDOCAINE HYDROCHLORIDE 20 MG/ML
INJECTION, SOLUTION EPIDURAL; INFILTRATION; INTRACAUDAL; PERINEURAL
Status: DISCONTINUED | OUTPATIENT
Start: 2022-02-23 | End: 2022-02-23

## 2022-02-23 RX ORDER — SODIUM CHLORIDE, SODIUM LACTATE, POTASSIUM CHLORIDE, CALCIUM CHLORIDE 600; 310; 30; 20 MG/100ML; MG/100ML; MG/100ML; MG/100ML
INJECTION, SOLUTION INTRAVENOUS CONTINUOUS
Status: DISCONTINUED | OUTPATIENT
Start: 2022-02-23 | End: 2022-02-23 | Stop reason: HOSPADM

## 2022-02-23 RX ADMIN — LIDOCAINE HYDROCHLORIDE 100 MG: 20 INJECTION, SOLUTION EPIDURAL; INFILTRATION; INTRACAUDAL; PERINEURAL at 12:02

## 2022-02-23 RX ADMIN — PROPOFOL 20 MG: 10 INJECTION, EMULSION INTRAVENOUS at 12:02

## 2022-02-23 RX ADMIN — PROPOFOL 100 MG: 10 INJECTION, EMULSION INTRAVENOUS at 12:02

## 2022-02-23 RX ADMIN — FENTANYL CITRATE 50 MCG: 50 INJECTION, SOLUTION INTRAMUSCULAR; INTRAVENOUS at 12:02

## 2022-02-23 RX ADMIN — PROPOFOL 30 MG: 10 INJECTION, EMULSION INTRAVENOUS at 12:02

## 2022-02-23 RX ADMIN — PROPOFOL 50 MG: 10 INJECTION, EMULSION INTRAVENOUS at 12:02

## 2022-02-23 RX ADMIN — SODIUM CHLORIDE, SODIUM LACTATE, POTASSIUM CHLORIDE, AND CALCIUM CHLORIDE: 600; 310; 30; 20 INJECTION, SOLUTION INTRAVENOUS at 12:02

## 2022-02-23 NOTE — ANESTHESIA POSTPROCEDURE EVALUATION
Anesthesia Post Evaluation    Patient: Sheryl Ortiz    Procedure(s) Performed: Procedure(s) (LRB):  COLONOSCOPY (N/A)  EGD (ESOPHAGOGASTRODUODENOSCOPY) (N/A)    Final Anesthesia Type: general      Patient location during evaluation: PACU  Patient participation: Yes- Able to Participate  Level of consciousness: awake and alert and oriented  Post-procedure vital signs: reviewed and stable  Pain management: adequate  Airway patency: patent    PONV status at discharge: No PONV  Anesthetic complications: no      Cardiovascular status: blood pressure returned to baseline, stable and hemodynamically stable  Respiratory status: unassisted  Hydration status: euvolemic  Follow-up not needed.          Vitals Value Taken Time   /81 02/23/22 1310   Temp 36.7 °C (98 °F) 02/23/22 1309   Pulse 69 02/23/22 1309   Resp 16 02/23/22 1309   SpO2 97 % 02/23/22 1309         No case tracking events are documented in the log.      Pain/Ady Score: Ady Score: 10 (2/23/2022  1:10 PM)

## 2022-02-23 NOTE — [employer]OVATION PATIENT INSTRUCTIONS
Discharge Summary/Instructions after an Endoscopic Procedure  Patient Name: Sheryl Ortiz  Patient MRN: [id number]  Patient YOB: 1965 Wednesday, February 23, 2022  Pattie Quiroz MD  Dear patient,  As a result of recent federal legislation (The Federal Cures Act), you may   receive lab or pathology results from your procedure in your [facility]   account before your physician is able to contact you. Your physician or   their representative will relay the results to you with their   recommendations at their soonest availability.  Thank you,  RESTRICTIONS:  During your procedure today, you received medications for sedation.  These   medications may affect your judgment, balance and coordination.  Therefore,   for 24 hours, you have the following restrictions:   - DO NOT drive a car, operate machinery, make legal/financial decisions,   sign important papers or drink alcohol.    ACTIVITY:  Today: no heavy lifting, straining or running due to procedural   sedation/anesthesia.  The following day: return to full activity including work.  DIET:  Eat and drink normally unless instructed otherwise.     TREATMENT FOR COMMON SIDE EFFECTS:  - Mild abdominal pain, nausea, belching, bloating or excessive gas:  rest,   eat lightly and use a heating pad.  - Sore Throat: treat with throat lozenges and/or gargle with warm salt   water.  - Because air was used during the procedure, expelling large amounts of air   from your rectum or belching is normal.  - If a bowel prep was taken, you may not have a bowel movement for 1-3 days.    This is normal.  SYMPTOMS TO WATCH FOR AND REPORT TO YOUR PHYSICIAN:  1. Abdominal pain or bloating, other than gas cramps.  2. Chest pain.  3. Back pain.  4. Signs of infection such as: chills or fever occurring within 24 hours   after the procedure.  5. Rectal bleeding, which would show as bright red, maroon, or black stools.   (A tablespoon of blood from the rectum is not serious,  especially if   hemorrhoids are present.)  6. Vomiting.  7. Weakness or dizziness.  GO DIRECTLY TO THE NEAREST EMERGENCY ROOM IF YOU HAVE ANY OF THE FOLLOWING:      Difficulty breathing              Chills and/or fever over 101 F   Persistent vomiting and/or vomiting blood   Severe abdominal pain   Severe chest pain   Black, tarry stools   Bleeding- more than one tablespoon   Any other symptom or condition that you feel may need urgent attention  Your doctor recommends these additional instructions:  If any biopsies were taken, your doctors clinic will contact you in 1 to 2   weeks with any results.  - Discharge patient to home.   - Resume previous diet.   - Continue present medications.   - Use fiber, for example Citrucel, Fibercon, Konsyl or Metamucil.   - Repeat colonoscopy in 10 years for screening purposes.  For questions, problems or results please call your physician Pattie Quiroz MD at Work:  [phone]  If you have any questions about the above instructions, call the GI   department at [phone] or call the endoscopy unit at [phone]   from 7am until 3 pm.  [facility] [place], EMERGENCY ROOM PHONE NUMBER:   [phone]  IF A COMPLICATION OR EMERGENCY SITUATION ARISES AND YOU ARE UNABLE TO REACH   YOUR PHYSICIAN - GO DIRECTLY TO THE EMERGENCY ROOM.  I have read or have had read to me these discharge instructions for my   procedure and have received a written copy.  I understand these   instructions and will follow-up with my physician if I have any questions.     __________________________________       _____________________________________  Nurse Signature                                          Patient/Designated   Responsible Party Signature  MD Pattie Worley MD  2/23/2022 12:54:43 PM  This report has been verified and signed electronically.  Dear patient,  As a result of recent federal legislation (The Federal Cures  Act), you may   receive lab or pathology results from your procedure in your [facility]   account before your physician is able to contact you. Your physician or   their representative will relay the results to you with their   recommendations at their soonest availability.  Thank you,  PROVATION

## 2022-02-23 NOTE — PROVATION PATIENT INSTRUCTIONS
Discharge Summary/Instructions after an Endoscopic Procedure  Patient Name: Sheryl Ortiz  Patient MRN: [id number]  Patient YOB: 1965 Wednesday, February 23, 2022  Pattie Quiroz MD  Dear patient,  As a result of recent federal legislation (The Federal Cures Act), you may   receive lab or pathology results from your procedure in your [facility]   account before your physician is able to contact you. Your physician or   their representative will relay the results to you with their   recommendations at their soonest availability.  Thank you,  RESTRICTIONS:  During your procedure today, you received medications for sedation.  These   medications may affect your judgment, balance and coordination.  Therefore,   for 24 hours, you have the following restrictions:   - DO NOT drive a car, operate machinery, make legal/financial decisions,   sign important papers or drink alcohol.    ACTIVITY:  Today: no heavy lifting, straining or running due to procedural   sedation/anesthesia.  The following day: return to full activity including work.  DIET:  Eat and drink normally unless instructed otherwise.     TREATMENT FOR COMMON SIDE EFFECTS:  - Mild abdominal pain, nausea, belching, bloating or excessive gas:  rest,   eat lightly and use a heating pad.  - Sore Throat: treat with throat lozenges and/or gargle with warm salt   water.  - Because air was used during the procedure, expelling large amounts of air   from your rectum or belching is normal.  - If a bowel prep was taken, you may not have a bowel movement for 1-3 days.    This is normal.  SYMPTOMS TO WATCH FOR AND REPORT TO YOUR PHYSICIAN:  1. Abdominal pain or bloating, other than gas cramps.  2. Chest pain.  3. Back pain.  4. Signs of infection such as: chills or fever occurring within 24 hours   after the procedure.  5. Rectal bleeding, which would show as bright red, maroon, or black stools.   (A tablespoon of blood from the rectum is not serious,  especially if   hemorrhoids are present.)  6. Vomiting.  7. Weakness or dizziness.  GO DIRECTLY TO THE NEAREST EMERGENCY ROOM IF YOU HAVE ANY OF THE FOLLOWING:      Difficulty breathing              Chills and/or fever over 101 F   Persistent vomiting and/or vomiting blood   Severe abdominal pain   Severe chest pain   Black, tarry stools   Bleeding- more than one tablespoon   Any other symptom or condition that you feel may need urgent attention  Your doctor recommends these additional instructions:  If any biopsies were taken, your doctors clinic will contact you in 1 to 2   weeks with any results.  - Discharge patient to home.   - Resume previous diet.   - Continue present medications.   - Await pathology results.   - No aspirin, ibuprofen, naproxen, or other non-steroidal anti-inflammatory   drugs.   - Use Protonix (pantoprazole) 40 mg PO BID for 2 months.   - Repeat upper endoscopy in 2 months to evaluate the response to therapy.  For questions, problems or results please call your physician Pattie Quiroz MD at Work:  [phone]  If you have any questions about the above instructions, call the GI   department at [phone] or call the endoscopy unit at [phone]   from 7am until 3 pm.  [facility], EMERGENCY ROOM PHONE NUMBER:   [phone]  IF A COMPLICATION OR EMERGENCY SITUATION ARISES AND YOU ARE UNABLE TO REACH   YOUR PHYSICIAN - GO DIRECTLY TO THE EMERGENCY ROOM.  I have read or have had read to me these discharge instructions for my   procedure and have received a written copy.  I understand these   instructions and will follow-up with my physician if I have any questions.     __________________________________       _____________________________________  Nurse Signature                                          Patient/Designated   Responsible Party Signature  MD Pattie Worley MD  2/23/2022 12:59:46 PM  This report  has been verified and signed electronically.  Dear patient,  As a result of recent federal legislation (The Federal Cures Act), you may   receive lab or pathology results from your procedure in your [facility]   account before your physician is able to contact you. Your physician or   their representative will relay the results to you with their   recommendations at their soonest availability.  Thank you,  PROVATION

## 2022-02-23 NOTE — TRANSFER OF CARE
"Anesthesia Transfer of Care Note    Patient: Sheryl Ortiz    Procedure(s) Performed: Procedure(s) (LRB):  COLONOSCOPY (N/A)  EGD (ESOPHAGOGASTRODUODENOSCOPY) (N/A)    Patient location: PACU    Anesthesia Type: general    Transport from OR: Transported from OR on room air with adequate spontaneous ventilation    Post pain: adequate analgesia    Post assessment: no apparent anesthetic complications    Post vital signs: stable    Level of consciousness: awake    Nausea/Vomiting: no nausea/vomiting    Complications: none    Transfer of care protocol was followed      Last vitals:   Visit Vitals  BP (!) 146/83 (BP Location: Left arm, Patient Position: Sitting)   Pulse 92   Temp 36.4 °C (97.6 °F) (Temporal)   Resp 18   Ht 5' 8" (1.727 m)   Wt 99.9 kg (220 lb 2.1 oz)   LMP  (LMP Unknown)   SpO2 97%   Breastfeeding No   BMI 33.47 kg/m²     "

## 2022-02-25 ENCOUNTER — TELEPHONE (OUTPATIENT)
Dept: GASTROENTEROLOGY | Facility: CLINIC | Age: 57
End: 2022-02-25

## 2022-02-25 NOTE — TELEPHONE ENCOUNTER
Returned call.  Patient stated that she is calling to schedule a repeat EGD in 2 months as she was instructed to do after her procedure this week.  There is no order in but the after procedure instructions do say to repeat in 2 months to assess.  Patient also needs a new prescription for protonix for BID as her current prescription is for daily.  Advised patient that I will find out if she needs an office visit or if we can go ahead and schedule procedure.  Message sent to Roxi.

## 2022-02-25 NOTE — TELEPHONE ENCOUNTER
----- Message from Irma Alston sent at 2/25/2022  3:50 PM CST -----  Contact: self [phone]  Type:  Sooner Apoointment Request    Caller is requesting a sooner appointment.  Caller declined first available appointment listed below.  Caller will not accept being placed on the waitlist and is requesting a message be sent to doctor.  Name of Caller Sheryl Ortiz    When is the first available appointment? 06/2022  Symptoms: 2 month f/u   Would the patient rather a call back or a response via [facility]?  Call back  Best Call Back Number: [phone]  Additional Information:

## 2022-02-28 ENCOUNTER — PATIENT MESSAGE (OUTPATIENT)
Dept: GASTROENTEROLOGY | Facility: CLINIC | Age: 57
End: 2022-02-28
Payer: MEDICAID

## 2022-02-28 DIAGNOSIS — R10.12 LUQ PAIN: ICD-10-CM

## 2022-02-28 DIAGNOSIS — K92.1 BLACK STOOLS: ICD-10-CM

## 2022-02-28 RX ORDER — PANTOPRAZOLE SODIUM 40 MG/1
40 TABLET, DELAYED RELEASE ORAL 2 TIMES DAILY
Qty: 60 TABLET | Refills: 1 | Status: SHIPPED | OUTPATIENT
Start: 2022-02-28 | End: 2022-03-02 | Stop reason: SDUPTHER

## 2022-03-01 DIAGNOSIS — K25.9 GASTRIC ULCER, UNSPECIFIED CHRONICITY, UNSPECIFIED WHETHER GASTRIC ULCER HEMORRHAGE OR PERFORATION PRESENT: Primary | ICD-10-CM

## 2022-03-02 ENCOUNTER — PATIENT MESSAGE (OUTPATIENT)
Dept: GASTROENTEROLOGY | Facility: CLINIC | Age: 57
End: 2022-03-02
Payer: MEDICAID

## 2022-03-02 DIAGNOSIS — R10.12 LUQ PAIN: ICD-10-CM

## 2022-03-02 DIAGNOSIS — K92.1 BLACK STOOLS: ICD-10-CM

## 2022-03-02 RX ORDER — PANTOPRAZOLE SODIUM 40 MG/1
40 TABLET, DELAYED RELEASE ORAL 2 TIMES DAILY
Qty: 60 TABLET | Refills: 1 | Status: SHIPPED | OUTPATIENT
Start: 2022-03-02 | End: 2022-03-03 | Stop reason: SDUPTHER

## 2022-03-02 NOTE — TELEPHONE ENCOUNTER
----- Message from Jerilyn Alcantar sent at 3/2/2022  9:41 AM CST -----  Contact: Patient, [phone]  Calling because the pharmacy tole her it is too soon to fill Rx pantoprazole (PROTONIX) 40 MG tablet. Please advise. Thanks.          [id number] [place] [id number] [address] [address] [facility] [address] [place] [address]  [address] [address]  Phone: [phone] Fax: [phone]

## 2022-03-03 DIAGNOSIS — R10.12 LUQ PAIN: ICD-10-CM

## 2022-03-03 DIAGNOSIS — K92.1 BLACK STOOLS: ICD-10-CM

## 2022-03-04 ENCOUNTER — PATIENT MESSAGE (OUTPATIENT)
Dept: GASTROENTEROLOGY | Facility: CLINIC | Age: 57
End: 2022-03-04
Payer: MEDICAID

## 2022-03-04 DIAGNOSIS — Z01.818 PRE-OP TESTING: ICD-10-CM

## 2022-03-04 RX ORDER — PANTOPRAZOLE SODIUM 40 MG/1
40 TABLET, DELAYED RELEASE ORAL 2 TIMES DAILY
Qty: 60 TABLET | Refills: 1 | Status: ON HOLD | OUTPATIENT
Start: 2022-03-04 | End: 2022-05-02 | Stop reason: SDUPTHER

## 2022-03-08 LAB
FINAL PATHOLOGIC DIAGNOSIS: NORMAL
GROSS: NORMAL
Lab: NORMAL
SUPPLEMENTAL DIAGNOSIS: NORMAL

## 2022-03-28 ENCOUNTER — PATIENT MESSAGE (OUTPATIENT)
Dept: GASTROENTEROLOGY | Facility: CLINIC | Age: 57
End: 2022-03-28
Payer: MEDICAID

## 2022-04-06 ENCOUNTER — PATIENT MESSAGE (OUTPATIENT)
Dept: GASTROENTEROLOGY | Facility: CLINIC | Age: 57
End: 2022-04-06
Payer: MEDICAID

## 2022-04-07 ENCOUNTER — HOSPITAL ENCOUNTER (OUTPATIENT)
Dept: RADIOLOGY | Facility: HOSPITAL | Age: 57
Discharge: HOME | End: 2022-04-07
Attending: FAMILY MEDICINE
Payer: MEDICAID

## 2022-04-07 VITALS — WEIGHT: 220.25 LBS | HEIGHT: 68 IN | BODY MASS INDEX: 33.38 KG/M2

## 2022-04-07 DIAGNOSIS — Z12.31 BREAST CANCER SCREENING BY MAMMOGRAM: ICD-10-CM

## 2022-04-07 PROCEDURE — 77067 MAMMO DIGITAL SCREENING BILAT WITH TOMO: ICD-10-PCS | Mod: 26,,, | Performed by: RADIOLOGY

## 2022-04-07 PROCEDURE — 77067 SCR MAMMO BI INCL CAD: CPT | Mod: 26,,, | Performed by: RADIOLOGY

## 2022-04-07 PROCEDURE — 77063 MAMMO DIGITAL SCREENING BILAT WITH TOMO: ICD-10-PCS | Mod: 26,,, | Performed by: RADIOLOGY

## 2022-04-07 PROCEDURE — 77063 BREAST TOMOSYNTHESIS BI: CPT | Mod: TC,PO

## 2022-04-07 PROCEDURE — 77063 BREAST TOMOSYNTHESIS BI: CPT | Mod: 26,,, | Performed by: RADIOLOGY

## 2022-04-19 ENCOUNTER — OFFICE VISIT (OUTPATIENT)
Dept: GASTROENTEROLOGY | Facility: CLINIC | Age: 57
End: 2022-04-19
Payer: MEDICAID

## 2022-04-19 DIAGNOSIS — K31.A0 GASTRIC INTESTINAL METAPLASIA: ICD-10-CM

## 2022-04-19 DIAGNOSIS — K25.9 GASTRIC ULCER, UNSPECIFIED CHRONICITY, UNSPECIFIED WHETHER GASTRIC ULCER HEMORRHAGE OR PERFORATION PRESENT: Primary | ICD-10-CM

## 2022-04-19 DIAGNOSIS — R10.12 LUQ PAIN: ICD-10-CM

## 2022-04-19 PROCEDURE — 1160F RVW MEDS BY RX/DR IN RCRD: CPT | Mod: CPTII,95,, | Performed by: PHYSICIAN ASSISTANT

## 2022-04-19 PROCEDURE — 1160F PR REVIEW ALL MEDS BY PRESCRIBER/CLIN PHARMACIST DOCUMENTED: ICD-10-PCS | Mod: CPTII,95,, | Performed by: PHYSICIAN ASSISTANT

## 2022-04-19 PROCEDURE — 99213 PR OFFICE/OUTPT VISIT, EST, LEVL III, 20-29 MIN: ICD-10-PCS | Mod: 95,,, | Performed by: PHYSICIAN ASSISTANT

## 2022-04-19 PROCEDURE — 1159F PR MEDICATION LIST DOCUMENTED IN MEDICAL RECORD: ICD-10-PCS | Mod: CPTII,95,, | Performed by: PHYSICIAN ASSISTANT

## 2022-04-19 PROCEDURE — 99213 OFFICE O/P EST LOW 20 MIN: CPT | Mod: 95,,, | Performed by: PHYSICIAN ASSISTANT

## 2022-04-19 PROCEDURE — 1159F MED LIST DOCD IN RCRD: CPT | Mod: CPTII,95,, | Performed by: PHYSICIAN ASSISTANT

## 2022-04-19 NOTE — PROGRESS NOTES
Subjective:      Patient ID: Sheryl Ortiz is a 56 y.o. female.    Chief Complaint: No chief complaint on file.  The patient location is: home  The chief complaint leading to consultation is: follow up ulcers    Visit type: audiovisual    Face to Face time with patient: 10 mins  12 minutes of total time spent on the encounter, which includes face to face time and non-face to face time preparing to see the patient (eg, review of tests), Obtaining and/or reviewing separately obtained history, Documenting clinical information in the electronic or other health record, Independently interpreting results (not separately reported) and communicating results to the patient/family/caregiver, or Care coordination (not separately reported).     Each patient to whom he or she provides medical services by telemedicine is:  (1) informed of the relationship between the physician and patient and the respective role of any other health care provider with respect to management of the patient; and (2) notified that he or she may decline to receive medical services by telemedicine and may withdraw from such care at any time.    Notes:   HPI:  Patient reports today via telemedicine for follow up of ulcers. She initially reported to me with history of occasional black stools and chronic LUQ pain worse with eating. EGD and colonoscopy performed. EGD found gastritis and non bleeding ulcers. Biopsies showed metaplasia but negative H. Pylori. Colonoscopy was normal. She was placed on Protonix BID.  Today she states she is taking her Protonix as prescribed. Notices much improvement in her symptoms, however she will have some breakthrough symptoms (pressure sensation to LUQ) after eating things with tomato sauce or spicy foods (mentions pepperoni pizza and crawfish). Denies black stools. Denies any additional complaints.  Repeat EGD scheduled for 5/2/22.    Review of Systems   Constitutional: Negative for activity change, appetite change,  chills, diaphoresis, fatigue, fever and unexpected weight change.   HENT: Negative for trouble swallowing.    Respiratory: Negative for shortness of breath.    Cardiovascular: Negative for chest pain.   Gastrointestinal: Positive for abdominal pain (occasional - see HPI). Negative for abdominal distention, blood in stool, constipation, diarrhea, nausea and vomiting.   Skin: Negative for color change.   Neurological: Negative for dizziness and weakness.   Psychiatric/Behavioral: Negative for dysphoric mood. The patient is not nervous/anxious.        Medical History: Reviewed    Social History: Reviewed    Allergies: Reviewed    Objective:     Physical Exam    Assessment:     1. Gastric ulcer, unspecified chronicity, unspecified whether gastric ulcer hemorrhage or perforation present    2. LUQ pain    3. Gastric intestinal metaplasia        Plan:     -Repeat EGD scheduled for 5/2/22   -Due to metaplasia, will need gastric mapping.  -Continue Protonix BID until scope.  -Continue GERD diet - recommend no spicy foods.  -Will touch base with patient once results of upcoming EGD reviewed.    Diagnoses and all orders for this visit:    Gastric ulcer, unspecified chronicity, unspecified whether gastric ulcer hemorrhage or perforation present    LUQ pain    Gastric intestinal metaplasia        No follow-ups on file.    Thank you for the opportunity to participate in the care of this patient.   Roxi Sanchez PA-C.

## 2022-04-27 ENCOUNTER — ANESTHESIA EVENT (OUTPATIENT)
Dept: ENDOSCOPY | Facility: HOSPITAL | Age: 57
End: 2022-04-27
Payer: MEDICAID

## 2022-04-27 NOTE — ANESTHESIA PREPROCEDURE EVALUATION
04/27/2022  Sheryl Ortiz is a 56 y.o., female.  No past medical history on file.    Past Surgical History:   Procedure Laterality Date    COLONOSCOPY N/A 2/23/2022    Procedure: COLONOSCOPY;  Surgeon: Pattie Quiroz MD;  Location: [facility];  Service: Endoscopy;  Laterality: N/A;    ESOPHAGOGASTRODUODENOSCOPY N/A 2/23/2022    Procedure: EGD (ESOPHAGOGASTRODUODENOSCOPY);  Surgeon: Pattie Quiroz MD;  Location: [facility];  Service: Endoscopy;  Laterality: N/A;    TUBAL LIGATION           Chronic Constipation been to ER and Colonoscopy Feb/2022      Pre-op Assessment    I have reviewed the Patient Summary Reports.     I have reviewed the Nursing Notes. I have reviewed the NPO Status.   I have reviewed the Medications.     Review of Systems  Anesthesia Hx:  No problems with previous Anesthesia    Hepatic/GI:   PUD, Chronic Constipation       Physical Exam  General: Well nourished    Airway:  Mallampati: II / II  Mouth Opening: Normal  TM Distance: Normal  Tongue: Normal  Neck ROM: Normal ROM    Dental:  Intact        Anesthesia Plan  Type of Anesthesia, risks & benefits discussed:    Anesthesia Type: Gen Natural Airway  Intra-op Monitoring Plan: Standard ASA Monitors  Induction:  IV  Informed Consent: Informed consent signed with the Patient and all parties understand the risks and agree with anesthesia plan.  All questions answered.   ASA Score: 2  Day of Surgery Review of History & Physical: H&P Update referred to the surgeon/provider.    Ready For Surgery From Anesthesia Perspective.     .

## 2022-04-29 RX ORDER — MONTELUKAST SODIUM 10 MG/1
10 TABLET ORAL NIGHTLY
COMMUNITY
Start: 2022-03-21 | End: 2024-03-11

## 2022-04-29 RX ORDER — BUDESONIDE AND FORMOTEROL FUMARATE DIHYDRATE 160; 4.5 UG/1; UG/1
2 AEROSOL RESPIRATORY (INHALATION) 2 TIMES DAILY
COMMUNITY
Start: 2022-03-21

## 2022-04-29 NOTE — PRE-PROCEDURE INSTRUCTIONS
PAT call completed.  Patient educated on procedure instructions.  Medical history discussed and patient informed of arrival time of 6:30 AM on Monday, May 2, 2022 at the [facility], and was made aware of the limited-visitor policy, and that  is to remain during the entire visit.  All questions and concerns addressed.  Endoscopy instructions reviewed. Instructed nothing to eat or drink after midnight the night before procedure.  Pre-procedure covid testing will be collected upon arrival.  Patient verbalized understanding of all instructions.

## 2022-05-02 ENCOUNTER — HOSPITAL ENCOUNTER (OUTPATIENT)
Dept: RADIOLOGY | Facility: HOSPITAL | Age: 57
Discharge: HOME | End: 2022-05-02
Attending: FAMILY MEDICINE
Payer: MEDICAID

## 2022-05-02 ENCOUNTER — HOSPITAL ENCOUNTER (OUTPATIENT)
Dept: RADIOLOGY | Facility: HOSPITAL | Age: 57
Discharge: HOME | End: 2022-05-02
Attending: FAMILY MEDICINE | Admitting: INTERNAL MEDICINE
Payer: MEDICAID

## 2022-05-02 ENCOUNTER — HOSPITAL ENCOUNTER (OUTPATIENT)
Facility: HOSPITAL | Age: 57
Discharge: HOME | End: 2022-05-02
Attending: INTERNAL MEDICINE | Admitting: INTERNAL MEDICINE
Payer: MEDICAID

## 2022-05-02 ENCOUNTER — ANESTHESIA (OUTPATIENT)
Dept: ENDOSCOPY | Facility: HOSPITAL | Age: 57
End: 2022-05-02
Payer: MEDICAID

## 2022-05-02 VITALS
TEMPERATURE: 98 F | HEIGHT: 68 IN | DIASTOLIC BLOOD PRESSURE: 66 MMHG | HEART RATE: 63 BPM | OXYGEN SATURATION: 98 % | WEIGHT: 226.75 LBS | RESPIRATION RATE: 18 BRPM | BODY MASS INDEX: 34.36 KG/M2 | SYSTOLIC BLOOD PRESSURE: 126 MMHG

## 2022-05-02 DIAGNOSIS — K92.1 BLACK STOOLS: ICD-10-CM

## 2022-05-02 DIAGNOSIS — K25.9 GASTRIC ULCER, UNSPECIFIED CHRONICITY, UNSPECIFIED WHETHER GASTRIC ULCER HEMORRHAGE OR PERFORATION PRESENT: Primary | ICD-10-CM

## 2022-05-02 DIAGNOSIS — R92.8 ABNORMAL MAMMOGRAM: ICD-10-CM

## 2022-05-02 DIAGNOSIS — K25.9 GASTRIC ULCER: ICD-10-CM

## 2022-05-02 DIAGNOSIS — R10.12 LUQ PAIN: ICD-10-CM

## 2022-05-02 LAB
CTP QC/QA: YES
SARS-COV-2 AG RESP QL IA.RAPID: NEGATIVE

## 2022-05-02 PROCEDURE — 77062 BREAST TOMOSYNTHESIS BI: CPT | Mod: 26,,, | Performed by: RADIOLOGY

## 2022-05-02 PROCEDURE — D9220A PRA ANESTHESIA: Mod: CRNA,,, | Performed by: NURSE ANESTHETIST, CERTIFIED REGISTERED

## 2022-05-02 PROCEDURE — 43235 EGD DIAGNOSTIC BRUSH WASH: CPT | Performed by: INTERNAL MEDICINE

## 2022-05-02 PROCEDURE — D9220A PRA ANESTHESIA: Mod: ANES,,, | Performed by: ANESTHESIOLOGY

## 2022-05-02 PROCEDURE — 63600175 PHARM REV CODE 636 W HCPCS: Performed by: INTERNAL MEDICINE

## 2022-05-02 PROCEDURE — 76642 ULTRASOUND BREAST LIMITED: CPT | Mod: 26,50,, | Performed by: RADIOLOGY

## 2022-05-02 PROCEDURE — 77066 MAMMO DIGITAL DIAGNOSTIC BILAT WITH TOMO: ICD-10-PCS | Mod: 26,,, | Performed by: RADIOLOGY

## 2022-05-02 PROCEDURE — 00731 ANES UPR GI NDSC PX NOS: CPT | Performed by: INTERNAL MEDICINE

## 2022-05-02 PROCEDURE — 77066 DX MAMMO INCL CAD BI: CPT | Mod: 26,,, | Performed by: RADIOLOGY

## 2022-05-02 PROCEDURE — D9220A PRA ANESTHESIA: ICD-10-PCS | Mod: CRNA,,, | Performed by: NURSE ANESTHETIST, CERTIFIED REGISTERED

## 2022-05-02 PROCEDURE — 37000008 HC ANESTHESIA 1ST 15 MINUTES: Performed by: INTERNAL MEDICINE

## 2022-05-02 PROCEDURE — 77062 MAMMO DIGITAL DIAGNOSTIC BILAT WITH TOMO: ICD-10-PCS | Mod: 26,,, | Performed by: RADIOLOGY

## 2022-05-02 PROCEDURE — 43235 PR EGD, FLEX, DIAGNOSTIC: ICD-10-PCS | Mod: ,,, | Performed by: INTERNAL MEDICINE

## 2022-05-02 PROCEDURE — 63600175 PHARM REV CODE 636 W HCPCS: Performed by: NURSE ANESTHETIST, CERTIFIED REGISTERED

## 2022-05-02 PROCEDURE — 76642 US BREAST BILATERAL LIMITED: ICD-10-PCS | Mod: 26,50,, | Performed by: RADIOLOGY

## 2022-05-02 PROCEDURE — D9220A PRA ANESTHESIA: ICD-10-PCS | Mod: ANES,,, | Performed by: ANESTHESIOLOGY

## 2022-05-02 PROCEDURE — 43235 EGD DIAGNOSTIC BRUSH WASH: CPT | Mod: ,,, | Performed by: INTERNAL MEDICINE

## 2022-05-02 PROCEDURE — 77066 DX MAMMO INCL CAD BI: CPT | Mod: TC

## 2022-05-02 PROCEDURE — 76642 ULTRASOUND BREAST LIMITED: CPT | Mod: TC,50

## 2022-05-02 RX ORDER — LIDOCAINE HCL/PF 100 MG/5ML
SYRINGE (ML) INTRAVENOUS
Status: DISCONTINUED | OUTPATIENT
Start: 2022-05-02 | End: 2022-05-02

## 2022-05-02 RX ORDER — PROPOFOL 10 MG/ML
VIAL (ML) INTRAVENOUS
Status: DISCONTINUED | OUTPATIENT
Start: 2022-05-02 | End: 2022-05-02

## 2022-05-02 RX ORDER — PANTOPRAZOLE SODIUM 40 MG/1
40 TABLET, DELAYED RELEASE ORAL DAILY
Qty: 30 TABLET | Refills: 2 | Status: SHIPPED | OUTPATIENT
Start: 2022-05-02 | End: 2022-08-08

## 2022-05-02 RX ORDER — FENTANYL CITRATE 50 UG/ML
INJECTION, SOLUTION INTRAMUSCULAR; INTRAVENOUS
Status: DISCONTINUED | OUTPATIENT
Start: 2022-05-02 | End: 2022-05-02

## 2022-05-02 RX ORDER — SODIUM CHLORIDE, SODIUM LACTATE, POTASSIUM CHLORIDE, CALCIUM CHLORIDE 600; 310; 30; 20 MG/100ML; MG/100ML; MG/100ML; MG/100ML
INJECTION, SOLUTION INTRAVENOUS CONTINUOUS
Status: DISCONTINUED | OUTPATIENT
Start: 2022-05-02 | End: 2022-05-02 | Stop reason: HOSPADM

## 2022-05-02 RX ADMIN — FENTANYL CITRATE 50 MCG: 50 INJECTION, SOLUTION INTRAMUSCULAR; INTRAVENOUS at 08:05

## 2022-05-02 RX ADMIN — PROPOFOL 100 MG: 10 INJECTION, EMULSION INTRAVENOUS at 08:05

## 2022-05-02 RX ADMIN — Medication 100 MG: at 08:05

## 2022-05-02 RX ADMIN — SODIUM CHLORIDE, SODIUM LACTATE, POTASSIUM CHLORIDE, AND CALCIUM CHLORIDE: .6; .31; .03; .02 INJECTION, SOLUTION INTRAVENOUS at 06:05

## 2022-05-02 NOTE — H&P
Short Stay Endoscopy History and Physical    PCP - Primary Doctor No    Procedure - EGD  ASA - 2  Mallampati - per anesthesia  History of Anesthesia problems - no  Family history Anesthesia problems -  no     HPI:  This is a 56 y.o. female here for evaluation of :   Active Hospital Problems    Diagnosis  POA    *Gastric ulcer [K25.9]  No      Resolved Hospital Problems   No resolved problems to display.       ROS:  CONSTITUTIONAL: Denies weight change,  fatigue, fevers, chills, night sweats.  CARDIOVASCULAR: Denies chest pain, shortness of breath, orthopnea and edema.  RESPIRATORY: Denies cough, hemoptysis, dyspnea, and wheezing.  GI: See HPI.    Medical History:   History reviewed. No pertinent past medical history.    Surgical History:   Past Surgical History:   Procedure Laterality Date    COLONOSCOPY N/A 2/23/2022    Procedure: COLONOSCOPY;  Surgeon: Pattie Quiroz MD;  Location: [facility];  Service: Endoscopy;  Laterality: N/A;    ESOPHAGOGASTRODUODENOSCOPY N/A 2/23/2022    Procedure: EGD (ESOPHAGOGASTRODUODENOSCOPY);  Surgeon: Pattie Quiroz MD;  Location: [facility];  Service: Endoscopy;  Laterality: N/A;    TUBAL LIGATION         Family History:  History reviewed. No pertinent family history.    Social History:   Social History     Tobacco Use    Smoking status: Current Every Day Smoker     Years: 25.00     Types: Cigarettes    Smokeless tobacco: Never Used   Substance Use Topics    Alcohol use: Not Currently    Drug use: Not Currently     Types: Marijuana, Methamphetamines       Allergy  Review of patient's allergies indicates:  No Known Allergies    Medications:   No current facility-administered medications on file prior to encounter.     Current Outpatient Medications on File Prior to Encounter   Medication Sig Dispense Refill    montelukast (SINGULAIR) 10 mg tablet Take 10 mg by mouth nightly.      SYMBICORT 160-4.5 mcg/actuation HFAA Inhale 2 puffs into the lungs 2 (two)  times daily.      hyoscyamine (LEVSIN/SL) 0.125 mg Subl Place 1 tablet (0.125 mg total) under the tongue every 6 (six) hours as needed (abdominal discomfort). 12 tablet 0       Physical Exam:  Vital Signs:   Vitals:    05/02/22 0640   BP: (!) 163/80   Pulse: 65   Resp: 16   Temp: 97.6 °F (36.4 °C)     General Appearance: Well appearing in no acute distress  ENT: OP clear  Chest: CTA B  CV: RRR, no m/r/g  Abd: s/nt/nd/nabs  Ext: no edema    Labs:  Reviewed    IMP:  Active Hospital Problems    Diagnosis  POA    *Gastric ulcer [K25.9]  No      Resolved Hospital Problems   No resolved problems to display.         Plan:  I have explained the risks and benefits of endoscopy procedures to the patient including but not limited to bleeding, perforation, infection, and death. The patient wishes to proceed.

## 2022-05-02 NOTE — DISCHARGE SUMMARY
[facility] - Endoscopy 1st Fl  Discharge Note  Short Stay    Procedure(s) (LRB):  EGD (ESOPHAGOGASTRODUODENOSCOPY) (N/A)    OUTCOME: Patient tolerated treatment/procedure well without complication and is now ready for discharge.    DISPOSITION: Home or Self Care    FINAL DIAGNOSIS:  Gastric ulcer    FOLLOWUP: With primary care provider    DISCHARGE INSTRUCTIONS:  No discharge procedures on file.

## 2022-05-02 NOTE — [employer] PATIENT INSTRUCTIONS
Discharge Summary/Instructions after an Endoscopic Procedure  Patient Name: Sheryl Ortiz  Patient MRN: [id number]  Patient YOB: 1965  Monday, May 2, 2022  Trish Stauffer MD  Dear patient,  As a result of recent federal legislation (The Federal Cures Act), you may   receive lab or pathology results from your procedure in your [facility]   account before your physician is able to contact you. Your physician or   their representative will relay the results to you with their   recommendations at their soonest availability.  Thank you,  RESTRICTIONS:  During your procedure today, you received medications for sedation.  These   medications may affect your judgment, balance and coordination.  Therefore,   for 24 hours, you have the following restrictions:   - DO NOT drive a car, operate machinery, make legal/financial decisions,   sign important papers or drink alcohol.    ACTIVITY:  Today: no heavy lifting, straining or running due to procedural   sedation/anesthesia.  The following day: return to full activity including work.  DIET:  Eat and drink normally unless instructed otherwise.     TREATMENT FOR COMMON SIDE EFFECTS:  - Mild abdominal pain, nausea, belching, bloating or excessive gas:  rest,   eat lightly and use a heating pad.  - Sore Throat: treat with throat lozenges and/or gargle with warm salt   water.  - Because air was used during the procedure, expelling large amounts of air   from your rectum or belching is normal.  - If a bowel prep was taken, you may not have a bowel movement for 1-3 days.    This is normal.  SYMPTOMS TO WATCH FOR AND REPORT TO YOUR PHYSICIAN:  1. Abdominal pain or bloating, other than gas cramps.  2. Chest pain.  3. Back pain.  4. Signs of infection such as: chills or fever occurring within 24 hours   after the procedure.  5. Rectal bleeding, which would show as bright red, maroon, or black stools.   (A tablespoon of blood from the rectum is not serious, especially if    hemorrhoids are present.)  6. Vomiting.  7. Weakness or dizziness.  GO DIRECTLY TO THE NEAREST EMERGENCY ROOM IF YOU HAVE ANY OF THE FOLLOWING:      Difficulty breathing              Chills and/or fever over 101 F   Persistent vomiting and/or vomiting blood   Severe abdominal pain   Severe chest pain   Black, tarry stools   Bleeding- more than one tablespoon   Any other symptom or condition that you feel may need urgent attention  Your doctor recommends these additional instructions:  If any biopsies were taken, your doctors clinic will contact you in 1 to 2   weeks with any results.  - Discharge patient to home (via wheelchair).   - Resume previous diet.   - Continue present medications.   - Can decrease PPI to once daily  - Patient has a contact number available for emergencies.  The signs and   symptoms of potential delayed complications were discussed with the   patient.  Return to normal activities tomorrow.  Written discharge   instructions were provided to the patient.   - Resume anticoagulant at prior dose.  For questions, problems or results please call your physician Trish Stauffer MD at Work:  [phone]  If you have any questions about the above instructions, call the GI   department at [phone] or call the endoscopy unit at [phone]   from 7am until 3 pm.  [facility] [place], EMERGENCY ROOM PHONE NUMBER:   [phone]  IF A COMPLICATION OR EMERGENCY SITUATION ARISES AND YOU ARE UNABLE TO REACH   YOUR PHYSICIAN - GO DIRECTLY TO THE EMERGENCY ROOM.  I have read or have had read to me these discharge instructions for my   procedure and have received a written copy.  I understand these   instructions and will follow-up with my physician if I have any questions.     __________________________________       _____________________________________  Nurse Signature                                          Patient/Designated   Responsible Party Signature  Trish Stauffer  MD Trish Stauffer MD  5/2/2022 8:21:36 AM  [employer]

## 2022-05-02 NOTE — ANESTHESIA POSTPROCEDURE EVALUATION
Anesthesia Post Evaluation    Patient: Sheryl Ortiz    Procedure(s) Performed: Procedure(s) (LRB):  EGD (ESOPHAGOGASTRODUODENOSCOPY) (N/A)    Final Anesthesia Type: general      Patient location during evaluation: PACU  Patient participation: Yes- Able to Participate  Level of consciousness: awake and alert and oriented  Post-procedure vital signs: reviewed and stable  Pain management: adequate  Airway patency: patent    PONV status at discharge: No PONV  Anesthetic complications: no      Cardiovascular status: blood pressure returned to baseline, stable and hemodynamically stable  Respiratory status: unassisted  Hydration status: euvolemic  Follow-up not needed.          Vitals Value Taken Time   /66 05/02/22 0850   Temp 36.7 °C (98.1 °F) 05/02/22 0825   Pulse 63 05/02/22 0850   Resp 18 05/02/22 0850   SpO2 98 % 05/02/22 0850         Event Time   Out of Recovery 08:55:00         Pain/Ady Score: Ady Score: 10 (5/2/2022  8:45 AM)

## 2022-05-02 NOTE — TRANSFER OF CARE
"Anesthesia Transfer of Care Note    Patient: Sheryl Ortiz    Procedure(s) Performed: Procedure(s) (LRB):  EGD (ESOPHAGOGASTRODUODENOSCOPY) (N/A)    Patient location: PACU    Anesthesia Type: general    Transport from OR: Transported from OR on room air with adequate spontaneous ventilation    Post pain: adequate analgesia    Post assessment: no apparent anesthetic complications    Post vital signs: stable    Level of consciousness: awake    Nausea/Vomiting: no nausea/vomiting    Complications: none    Transfer of care protocol was followed      Last vitals:   Visit Vitals  BP (!) 111/59 (BP Location: Left arm, Patient Position: Lying)   Pulse 65   Temp 36.7 °C (98.1 °F) (Temporal)   Resp 16   Ht 5' 8" (1.727 m)   Wt 102.9 kg (226 lb 11.9 oz)   SpO2 96%   Breastfeeding No   BMI 34.48 kg/m²     "

## 2022-09-06 ENCOUNTER — HOSPITAL ENCOUNTER (EMERGENCY)
Facility: HOSPITAL | Age: 57
Discharge: HOME | End: 2022-09-06
Attending: EMERGENCY MEDICINE
Payer: MEDICAID

## 2022-09-06 VITALS
RESPIRATION RATE: 20 BRPM | BODY MASS INDEX: 32.97 KG/M2 | OXYGEN SATURATION: 97 % | TEMPERATURE: 98 F | HEART RATE: 69 BPM | WEIGHT: 216.81 LBS | DIASTOLIC BLOOD PRESSURE: 68 MMHG | SYSTOLIC BLOOD PRESSURE: 127 MMHG

## 2022-09-06 DIAGNOSIS — R42 DIZZINESS: Primary | ICD-10-CM

## 2022-09-06 DIAGNOSIS — S16.1XXA STRAIN OF NECK MUSCLE, INITIAL ENCOUNTER: ICD-10-CM

## 2022-09-06 DIAGNOSIS — F12.90 MARIJUANA USE: ICD-10-CM

## 2022-09-06 DIAGNOSIS — R53.1 WEAKNESS: ICD-10-CM

## 2022-09-06 LAB
ALBUMIN SERPL BCP-MCNC: 4.3 G/DL (ref 3.5–5.2)
ALP SERPL-CCNC: 79 U/L (ref 55–135)
ALT SERPL W/O P-5'-P-CCNC: 13 U/L (ref 10–44)
AMPHET+METHAMPHET UR QL: NEGATIVE
ANION GAP SERPL CALC-SCNC: 10 MMOL/L (ref 8–16)
AST SERPL-CCNC: 16 U/L (ref 10–40)
BARBITURATES UR QL SCN>200 NG/ML: NEGATIVE
BASOPHILS # BLD AUTO: 0.09 K/UL (ref 0–0.2)
BASOPHILS NFR BLD: 1 % (ref 0–1.9)
BENZODIAZ UR QL SCN>200 NG/ML: NEGATIVE
BILIRUB SERPL-MCNC: 0.4 MG/DL (ref 0.1–1)
BILIRUB UR QL STRIP: NEGATIVE
BUN SERPL-MCNC: 17 MG/DL (ref 6–20)
BZE UR QL SCN: NEGATIVE
CALCIUM SERPL-MCNC: 9.7 MG/DL (ref 8.7–10.5)
CANNABINOIDS UR QL SCN: ABNORMAL
CHLORIDE SERPL-SCNC: 107 MMOL/L (ref 95–110)
CLARITY UR REFRACT.AUTO: ABNORMAL
CO2 SERPL-SCNC: 24 MMOL/L (ref 23–29)
COLOR UR AUTO: YELLOW
CREAT SERPL-MCNC: 0.8 MG/DL (ref 0.5–1.4)
CREAT UR-MCNC: 140.4 MG/DL (ref 15–325)
DIFFERENTIAL METHOD: ABNORMAL
EOSINOPHIL # BLD AUTO: 0.4 K/UL (ref 0–0.5)
EOSINOPHIL NFR BLD: 3.9 % (ref 0–8)
ERYTHROCYTE [DISTWIDTH] IN BLOOD BY AUTOMATED COUNT: 20 % (ref 11.5–14.5)
EST. GFR  (NO RACE VARIABLE): >60 ML/MIN/1.73 M^2
ETHANOL SERPL-MCNC: <10 MG/DL
GLUCOSE SERPL-MCNC: 106 MG/DL (ref 70–110)
GLUCOSE UR QL STRIP: NEGATIVE
HCT VFR BLD AUTO: 35.7 % (ref 37–48.5)
HGB BLD-MCNC: 10.2 G/DL (ref 12–16)
HGB UR QL STRIP: ABNORMAL
IMM GRANULOCYTES # BLD AUTO: 0.02 K/UL (ref 0–0.04)
IMM GRANULOCYTES NFR BLD AUTO: 0.2 % (ref 0–0.5)
KETONES UR QL STRIP: NEGATIVE
LEUKOCYTE ESTERASE UR QL STRIP: NEGATIVE
LYMPHOCYTES # BLD AUTO: 2 K/UL (ref 1–4.8)
LYMPHOCYTES NFR BLD: 21.3 % (ref 18–48)
MCH RBC QN AUTO: 18.2 PG (ref 27–31)
MCHC RBC AUTO-ENTMCNC: 28.6 G/DL (ref 32–36)
MCV RBC AUTO: 64 FL (ref 82–98)
METHADONE UR QL SCN>300 NG/ML: NEGATIVE
MONOCYTES # BLD AUTO: 0.6 K/UL (ref 0.3–1)
MONOCYTES NFR BLD: 6.9 % (ref 4–15)
NEUTROPHILS # BLD AUTO: 6.2 K/UL (ref 1.8–7.7)
NEUTROPHILS NFR BLD: 66.7 % (ref 38–73)
NITRITE UR QL STRIP: NEGATIVE
NRBC BLD-RTO: 0 /100 WBC
OPIATES UR QL SCN: NEGATIVE
PCP UR QL SCN>25 NG/ML: NEGATIVE
PH UR STRIP: 6 [PH] (ref 5–8)
PLATELET # BLD AUTO: 211 K/UL (ref 150–450)
PMV BLD AUTO: ABNORMAL FL (ref 9.2–12.9)
POTASSIUM SERPL-SCNC: 3.6 MMOL/L (ref 3.5–5.1)
PROT SERPL-MCNC: 7.5 G/DL (ref 6–8.4)
PROT UR QL STRIP: ABNORMAL
RBC # BLD AUTO: 5.6 M/UL (ref 4–5.4)
SODIUM SERPL-SCNC: 141 MMOL/L (ref 136–145)
SP GR UR STRIP: 1.03 (ref 1–1.03)
TOXICOLOGY INFORMATION: ABNORMAL
URN SPEC COLLECT METH UR: ABNORMAL
UROBILINOGEN UR STRIP-ACNC: NEGATIVE EU/DL
WBC # BLD AUTO: 9.31 K/UL (ref 3.9–12.7)

## 2022-09-06 PROCEDURE — 85025 COMPLETE CBC W/AUTO DIFF WBC: CPT | Mod: ER | Performed by: EMERGENCY MEDICINE

## 2022-09-06 PROCEDURE — 81003 URINALYSIS AUTO W/O SCOPE: CPT | Mod: ER,59 | Performed by: EMERGENCY MEDICINE

## 2022-09-06 PROCEDURE — 93005 ELECTROCARDIOGRAM TRACING: CPT | Mod: ER

## 2022-09-06 PROCEDURE — 93010 EKG 12-LEAD: ICD-10-PCS | Mod: ,,, | Performed by: INTERNAL MEDICINE

## 2022-09-06 PROCEDURE — 25000242 PHARM REV CODE 250 ALT 637 W/ HCPCS: Mod: ER | Performed by: EMERGENCY MEDICINE

## 2022-09-06 PROCEDURE — 99285 EMERGENCY DEPT VISIT HI MDM: CPT | Mod: 25,ER

## 2022-09-06 PROCEDURE — 93010 ELECTROCARDIOGRAM REPORT: CPT | Mod: ,,, | Performed by: INTERNAL MEDICINE

## 2022-09-06 PROCEDURE — 80053 COMPREHEN METABOLIC PANEL: CPT | Mod: ER | Performed by: EMERGENCY MEDICINE

## 2022-09-06 PROCEDURE — 82077 ASSAY SPEC XCP UR&BREATH IA: CPT | Mod: ER | Performed by: EMERGENCY MEDICINE

## 2022-09-06 PROCEDURE — 80307 DRUG TEST PRSMV CHEM ANLYZR: CPT | Mod: ER | Performed by: EMERGENCY MEDICINE

## 2022-09-06 PROCEDURE — 94640 AIRWAY INHALATION TREATMENT: CPT | Mod: ER

## 2022-09-06 RX ORDER — IPRATROPIUM BROMIDE AND ALBUTEROL SULFATE 2.5; .5 MG/3ML; MG/3ML
3 SOLUTION RESPIRATORY (INHALATION)
Status: COMPLETED | OUTPATIENT
Start: 2022-09-06 | End: 2022-09-06

## 2022-09-06 RX ADMIN — IPRATROPIUM BROMIDE AND ALBUTEROL SULFATE 3 ML: 2.5; .5 SOLUTION RESPIRATORY (INHALATION) at 05:09

## 2022-09-06 NOTE — ED PROVIDER NOTES
Encounter Date: 9/6/2022       History     Chief Complaint   Patient presents with    Dizziness     Dizziness began today when bending down. Neck pain x 1 week. Denies headache. Was outside a lot today. Had used inhaler 4 times. Former smoker, quit 1 year ago after smoking 20 years sometimes up to 3 packs a day. Not currently dizzy. Denies blurred vision. Admits to using marajuana gummies which help her sleep but hasnt used them in a few days. Dizziness only occurred twice when bending over. Pt feels normal right now.      She is here for some orthostatic symptoms earlier today.  She had some pre-existing right anterolateral neck pain suggestive of routine torticollis onset in her sleep for about a week, not really changed much yet.  Works cleaning houses, underlying history of asthma and significant tobacco abuse, now quit.  Previous history of substance abuse as well including methamphetamine.  Does not spend much time outside but was outdoors earlier today in significant heat and humidity, developed some orthostatics symptoms of feeling lightheaded bending over, dizzy, feeling that things seemed bright, and has had some increase in her wheezing today.  She used her home inhalers and nebulizer a few times today, she tries not to overuse them because of anxiety or jitteriness.  No chest pain or localizing neurologic complaints. No other complaints.     The history is provided by the patient. No  was used.   Review of patient's allergies indicates:  No Known Allergies  History reviewed. No pertinent past medical history.  Past Surgical History:   Procedure Laterality Date    COLONOSCOPY N/A 02/23/2022    Procedure: COLONOSCOPY;  Surgeon: Pattie Quiroz MD;  Location: [facility];  Service: Endoscopy;  Laterality: N/A;    ESOPHAGOGASTRODUODENOSCOPY N/A 02/23/2022    Procedure: EGD (ESOPHAGOGASTRODUODENOSCOPY);  Surgeon: Pattie Quiroz MD;  Location: [facility];  Service:  Endoscopy;  Laterality: N/A;    ESOPHAGOGASTRODUODENOSCOPY N/A 5/2/2022    Procedure: EGD (ESOPHAGOGASTRODUODENOSCOPY);  Surgeon: Trish Stauffer MD;  Location: [facility];  Service: Endoscopy;  Laterality: N/A;    TUBAL LIGATION       History reviewed. No pertinent family history.  Social History     Tobacco Use    Smoking status: Every Day     Years: 25.00     Types: Cigarettes    Smokeless tobacco: Never   Substance Use Topics    Alcohol use: Not Currently    Drug use: Not Currently     Types: Marijuana, Methamphetamines     Review of Systems   Constitutional:  Negative for activity change, fatigue and fever.   HENT:  Negative for congestion, ear pain, facial swelling, nosebleeds, sinus pressure and sore throat.    Eyes:  Negative for pain, discharge, redness and visual disturbance.   Respiratory:  Positive for shortness of breath and wheezing. Negative for cough, choking and chest tightness.    Cardiovascular:  Negative for chest pain, palpitations and leg swelling.   Gastrointestinal:  Negative for abdominal distention, abdominal pain, nausea and vomiting.   Endocrine: Negative for heat intolerance, polydipsia and polyuria.   Genitourinary:  Negative for difficulty urinating, dysuria, flank pain, hematuria and urgency.   Musculoskeletal:  Positive for neck pain and neck stiffness. Negative for back pain, gait problem, joint swelling and myalgias.   Skin:  Negative for color change and rash.   Allergic/Immunologic: Negative for environmental allergies and food allergies.   Neurological:  Positive for dizziness and light-headedness. Negative for weakness, numbness and headaches.   Hematological:  Negative for adenopathy. Does not bruise/bleed easily.   Psychiatric/Behavioral:  Negative for agitation and behavioral problems. The patient is not nervous/anxious.    All other systems reviewed and are negative.    Physical Exam     Initial Vitals [09/06/22 1620]   BP Pulse Resp Temp SpO2   (!) 165/85 84 16 98.1 °F  (36.7 °C) 98 %      MAP       --         Physical Exam    Nursing note and vitals reviewed.  Constitutional: She appears well-developed and well-nourished. She is not diaphoretic. No distress.   HENT:   Head: Normocephalic and atraumatic.   Mouth/Throat: No oropharyngeal exudate.   Eyes: Conjunctivae and EOM are normal. Pupils are equal, round, and reactive to light. Right eye exhibits no discharge. Left eye exhibits no discharge. No scleral icterus.   Neck: Neck supple. No thyromegaly present. No tracheal deviation present. No JVD present.   Normal range of motion.  Cardiovascular:  Normal rate, regular rhythm, normal heart sounds and intact distal pulses.     Exam reveals no gallop and no friction rub.       No murmur heard.  Pulmonary/Chest: No stridor. No respiratory distress. She has wheezes. She has no rhonchi. She has no rales. She exhibits no tenderness.   Tight diffuse wheezing with decreased air entry throughout   Abdominal: Abdomen is soft. Bowel sounds are normal. She exhibits no distension and no mass. There is no abdominal tenderness. There is no rebound and no guarding.   Musculoskeletal:         General: No tenderness or edema. Normal range of motion.      Cervical back: Normal range of motion and neck supple.     Neurological: She is alert and oriented to person, place, and time. She has normal strength.   Skin: Skin is warm and dry. No rash and no abscess noted. No erythema.   Psychiatric: She has a normal mood and affect. Her behavior is normal. Judgment and thought content normal.       ED Course   Procedures  Labs Reviewed   CBC W/ AUTO DIFFERENTIAL - Abnormal; Notable for the following components:       Result Value    RBC 5.60 (*)     Hemoglobin 10.2 (*)     Hematocrit 35.7 (*)     MCV 64 (*)     MCH 18.2 (*)     MCHC 28.6 (*)     RDW 20.0 (*)     All other components within normal limits   URINALYSIS, REFLEX TO URINE CULTURE - Abnormal; Notable for the following components:    Appearance, UA  Hazy (*)     Protein, UA Trace (*)     Occult Blood UA Trace (*)     All other components within normal limits    Narrative:     Specimen Source->Urine   DRUG SCREEN PANEL, URINE EMERGENCY - Abnormal; Notable for the following components:    THC Presumptive Positive (*)     All other components within normal limits    Narrative:     Specimen Source->Urine   COMPREHENSIVE METABOLIC PANEL   ALCOHOL,MEDICAL (ETHANOL)       EKG Readings: (Independently Interpreted)   Initial Reading: No STEMI. Rhythm: Normal Sinus Rhythm. Heart Rate: 73. Ectopy: No Ectopy. Conduction: Normal. ST Segments: Normal ST Segments. T Waves: Normal.   Normal sinus rhythm at 73 beats per minute, left atrial enlargement, no other significant findings     Imaging Results              X-Ray Chest PA And Lateral (Final result)  Result time 09/06/22 17:27:55      Final result by Ruddy Lala MD (09/06/22 17:27:55)                   Impression:      No acute abnormality.      Electronically signed by: Ruddy Lala  Date:    09/06/2022  Time:    17:27               Narrative:    EXAMINATION:  XR CHEST PA AND LATERAL    CLINICAL HISTORY:  Chest Pain;    TECHNIQUE:  PA and lateral views of the chest were performed.    COMPARISON:  None    FINDINGS:  The lungs are clear, with normal appearance of pulmonary vasculature and no pleural effusion or pneumothorax.    The cardiac silhouette is normal in size. The hilar and mediastinal contours are unremarkable.    Bones are intact.                                           Medications   albuterol-ipratropium 2.5 mg-0.5 mg/3 mL nebulizer solution 3 mL (has no administration in time range)     Medical Decision Making:   Clinical Tests:   Lab Tests: Reviewed  ED Management:  Assumed care from Dr. Austin at 6:00 p.m.  Following return of laboratory values and x-ray studies, discussed with the patient daily likely etiology of her dizziness episodes indeed may be postural and she concurs that many times this is  the source of her turning dizzy and spontaneously resolving.  Patient has a history of drug use in the past but has long been free of drugs and this is not suspected in this particular case.  She is discharged in good condition all questions were addressed and to the patient's satisfaction.                    Clinical Impression:          ICD-10-CM ICD-9-CM   1. Dizziness  R42 780.4   2. Weakness  R53.1 780.79   3. Marijuana use  F12.90 305.20   4. Strain of neck muscle, initial encounter  S16.1XXA 847.0               Carlos Vasquez MD  09/07/22 0426

## 2023-03-15 ENCOUNTER — HOSPITAL ENCOUNTER (EMERGENCY)
Facility: HOSPITAL | Age: 58
Discharge: HOME | End: 2023-03-15
Attending: EMERGENCY MEDICINE
Payer: MEDICAID

## 2023-03-15 VITALS
DIASTOLIC BLOOD PRESSURE: 77 MMHG | WEIGHT: 208 LBS | HEART RATE: 75 BPM | BODY MASS INDEX: 31.63 KG/M2 | RESPIRATION RATE: 16 BRPM | TEMPERATURE: 98 F | SYSTOLIC BLOOD PRESSURE: 166 MMHG | OXYGEN SATURATION: 96 %

## 2023-03-15 DIAGNOSIS — J45.909 ASTHMA, UNSPECIFIED ASTHMA SEVERITY, UNSPECIFIED WHETHER COMPLICATED, UNSPECIFIED WHETHER PERSISTENT: Primary | ICD-10-CM

## 2023-03-15 PROCEDURE — 99283 EMERGENCY DEPT VISIT LOW MDM: CPT | Mod: ER

## 2023-03-15 RX ORDER — ALBUTEROL SULFATE 90 UG/1
1-2 AEROSOL, METERED RESPIRATORY (INHALATION) EVERY 6 HOURS PRN
Qty: 6.7 G | Refills: 0 | Status: SHIPPED | OUTPATIENT
Start: 2023-03-15 | End: 2024-03-11

## 2023-03-15 NOTE — ED PROVIDER NOTES
Encounter Date: 3/15/2023       History     Chief Complaint   Patient presents with    Asthma     Out of rescue inhaler, could not get in with primary and was told to come to er. Has not taken singulair or simbicort since sunday     Patient complains of asthma without her medication she is out of town due to some car trouble and is hoping that we can simply refill an albuterol prescription for her.  She would prefer not to have a breathing treatment here.  Patient in no distress denies known exacerbating or mitigating factors did not take anything prior to arrival for this problem.      Review of patient's allergies indicates:  No Known Allergies  No past medical history on file.  Past Surgical History:   Procedure Laterality Date    COLONOSCOPY N/A 02/23/2022    Procedure: COLONOSCOPY;  Surgeon: Pattie Quiroz MD;  Location: [facility];  Service: Endoscopy;  Laterality: N/A;    ESOPHAGOGASTRODUODENOSCOPY N/A 02/23/2022    Procedure: EGD (ESOPHAGOGASTRODUODENOSCOPY);  Surgeon: Pattie Quiroz MD;  Location: [facility];  Service: Endoscopy;  Laterality: N/A;    ESOPHAGOGASTRODUODENOSCOPY N/A 5/2/2022    Procedure: EGD (ESOPHAGOGASTRODUODENOSCOPY);  Surgeon: Trish Stauffer MD;  Location: [facility];  Service: Endoscopy;  Laterality: N/A;    TUBAL LIGATION       No family history on file.  Social History     Tobacco Use    Smoking status: Every Day     Years: 25.00     Types: Cigarettes    Smokeless tobacco: Never   Substance Use Topics    Alcohol use: Not Currently    Drug use: Not Currently     Types: Marijuana, Methamphetamines     Review of Systems   Constitutional:  Negative for fever.   HENT:  Negative for sore throat.    Respiratory:  Positive for wheezing. Negative for shortness of breath.    Cardiovascular:  Negative for chest pain.   Gastrointestinal:  Negative for nausea.   Genitourinary:  Negative for dysuria.   Musculoskeletal:  Negative for back pain.   Skin:  Negative for rash.    Neurological:  Negative for weakness.   Hematological:  Does not bruise/bleed easily.     Physical Exam     Initial Vitals [03/15/23 1218]   BP Pulse Resp Temp SpO2   (!) 166/77 75 16 98.1 °F (36.7 °C) 96 %      MAP       --         Physical Exam    Nursing note and vitals reviewed.  Constitutional: She appears well-developed and well-nourished. She is not diaphoretic. She is active.  Non-toxic appearance. No distress.   HENT:   Head: Normocephalic and atraumatic.   Eyes: Conjunctivae are normal. Right eye exhibits no discharge. Left eye exhibits no discharge. No scleral icterus.   Neck:   Normal range of motion.  Cardiovascular:  Normal rate, regular rhythm and intact distal pulses.           No murmur heard.  Pulmonary/Chest: No respiratory distress. She has wheezes.   Abdominal: She exhibits no distension.   Musculoskeletal:         General: No tenderness. Normal range of motion.      Cervical back: Normal range of motion.     Neurological: She is alert and oriented to person, place, and time. No cranial nerve deficit. GCS score is 15. GCS eye subscore is 4. GCS verbal subscore is 5. GCS motor subscore is 6.   Skin: Skin is warm and dry. Capillary refill takes less than 2 seconds. No rash noted.   Psychiatric: She has a normal mood and affect. Her behavior is normal. Judgment and thought content normal.       ED Course   Procedures  Labs Reviewed - No data to display       Imaging Results    None          Medications - No data to display                           Clinical Impression:   Final diagnoses:  [J45.909] Asthma, unspecified asthma severity, unspecified whether complicated, unspecified whether persistent (Primary)        ED Disposition Condition    Discharge Stable          ED Prescriptions       Medication Sig Dispense Start Date End Date Auth. Provider    albuterol (PROVENTIL/VENTOLIN HFA) 90 mcg/actuation inhaler Inhale 1-2 puffs into the lungs every 6 (six) hours as needed for Wheezing. Rescue 6.7 g  3/15/2023 3/14/2024 Kendrick Rodriguez NP          Follow-up Information       Follow up With Specialties Details Why Contact Info    pcp  Call  As needed              Kendrick Rodriguez NP  03/15/23 1336

## 2023-03-15 NOTE — Clinical Note
"Sheryl "Jhoana Ortiz was seen and treated in our emergency department on 3/15/2023.  She may return to work on 03/15/2023.       If you have any questions or concerns, please don't hesitate to call.      Kendrick Rodriguez NP"

## 2023-08-27 ENCOUNTER — HOSPITAL ENCOUNTER (EMERGENCY)
Facility: HOSPITAL | Age: 58
Discharge: HOME | End: 2023-08-27
Attending: EMERGENCY MEDICINE
Payer: MEDICAID

## 2023-08-27 VITALS
BODY MASS INDEX: 33.69 KG/M2 | TEMPERATURE: 98 F | DIASTOLIC BLOOD PRESSURE: 63 MMHG | RESPIRATION RATE: 21 BRPM | WEIGHT: 221.56 LBS | OXYGEN SATURATION: 94 % | HEART RATE: 85 BPM | SYSTOLIC BLOOD PRESSURE: 131 MMHG

## 2023-08-27 DIAGNOSIS — R06.02 SOB (SHORTNESS OF BREATH): ICD-10-CM

## 2023-08-27 DIAGNOSIS — J45.41 MODERATE PERSISTENT ASTHMA WITH EXACERBATION: Primary | ICD-10-CM

## 2023-08-27 DIAGNOSIS — Z72.0 TOBACCO ABUSE: ICD-10-CM

## 2023-08-27 LAB
ALBUMIN SERPL BCP-MCNC: 3.9 G/DL (ref 3.5–5.2)
ALP SERPL-CCNC: 73 U/L (ref 55–135)
ALT SERPL W/O P-5'-P-CCNC: 13 U/L (ref 10–44)
ANION GAP SERPL CALC-SCNC: 10 MMOL/L (ref 8–16)
AST SERPL-CCNC: 16 U/L (ref 10–40)
BASOPHILS # BLD AUTO: 0.08 K/UL (ref 0–0.2)
BASOPHILS NFR BLD: 1.3 % (ref 0–1.9)
BILIRUB SERPL-MCNC: 0.3 MG/DL (ref 0.1–1)
BNP SERPL-MCNC: 28 PG/ML (ref 0–99)
BUN SERPL-MCNC: 12 MG/DL (ref 6–20)
CALCIUM SERPL-MCNC: 9.4 MG/DL (ref 8.7–10.5)
CHLORIDE SERPL-SCNC: 108 MMOL/L (ref 95–110)
CO2 SERPL-SCNC: 23 MMOL/L (ref 23–29)
CREAT SERPL-MCNC: 0.8 MG/DL (ref 0.5–1.4)
CTP QC/QA: YES
CTP QC/QA: YES
DIFFERENTIAL METHOD: ABNORMAL
EOSINOPHIL # BLD AUTO: 0.5 K/UL (ref 0–0.5)
EOSINOPHIL NFR BLD: 7.5 % (ref 0–8)
ERYTHROCYTE [DISTWIDTH] IN BLOOD BY AUTOMATED COUNT: 19.5 % (ref 11.5–14.5)
EST. GFR  (NO RACE VARIABLE): >60 ML/MIN/1.73 M^2
GLUCOSE SERPL-MCNC: 120 MG/DL (ref 70–110)
HCT VFR BLD AUTO: 38 % (ref 37–48.5)
HGB BLD-MCNC: 11.3 G/DL (ref 12–16)
IMM GRANULOCYTES # BLD AUTO: 0.01 K/UL (ref 0–0.04)
IMM GRANULOCYTES NFR BLD AUTO: 0.2 % (ref 0–0.5)
LYMPHOCYTES # BLD AUTO: 1.5 K/UL (ref 1–4.8)
LYMPHOCYTES NFR BLD: 22.8 % (ref 18–48)
MCH RBC QN AUTO: 20.8 PG (ref 27–31)
MCHC RBC AUTO-ENTMCNC: 29.7 G/DL (ref 32–36)
MCV RBC AUTO: 70 FL (ref 82–98)
MONOCYTES # BLD AUTO: 0.6 K/UL (ref 0.3–1)
MONOCYTES NFR BLD: 8.6 % (ref 4–15)
NEUTROPHILS # BLD AUTO: 3.8 K/UL (ref 1.8–7.7)
NEUTROPHILS NFR BLD: 59.6 % (ref 38–73)
NRBC BLD-RTO: 0 /100 WBC
PLATELET # BLD AUTO: 174 K/UL (ref 150–450)
PMV BLD AUTO: 10.6 FL (ref 9.2–12.9)
POC MOLECULAR INFLUENZA A AGN: NEGATIVE
POC MOLECULAR INFLUENZA B AGN: NEGATIVE
POTASSIUM SERPL-SCNC: 4 MMOL/L (ref 3.5–5.1)
PROT SERPL-MCNC: 7 G/DL (ref 6–8.4)
RBC # BLD AUTO: 5.42 M/UL (ref 4–5.4)
SARS-COV-2 RDRP RESP QL NAA+PROBE: NEGATIVE
SODIUM SERPL-SCNC: 141 MMOL/L (ref 136–145)
TROPONIN I SERPL DL<=0.01 NG/ML-MCNC: 0.01 NG/ML (ref 0–0.03)
WBC # BLD AUTO: 6.36 K/UL (ref 3.9–12.7)

## 2023-08-27 PROCEDURE — 96374 THER/PROPH/DIAG INJ IV PUSH: CPT | Mod: ER

## 2023-08-27 PROCEDURE — 87502 INFLUENZA DNA AMP PROBE: CPT | Mod: ER

## 2023-08-27 PROCEDURE — 94640 AIRWAY INHALATION TREATMENT: CPT | Mod: ER,XB

## 2023-08-27 PROCEDURE — 87635 SARS-COV-2 COVID-19 AMP PRB: CPT | Mod: ER | Performed by: EMERGENCY MEDICINE

## 2023-08-27 PROCEDURE — 80053 COMPREHEN METABOLIC PANEL: CPT | Mod: ER | Performed by: EMERGENCY MEDICINE

## 2023-08-27 PROCEDURE — 25000242 PHARM REV CODE 250 ALT 637 W/ HCPCS: Mod: ER | Performed by: EMERGENCY MEDICINE

## 2023-08-27 PROCEDURE — 63600175 PHARM REV CODE 636 W HCPCS: Mod: ER | Performed by: EMERGENCY MEDICINE

## 2023-08-27 PROCEDURE — 85025 COMPLETE CBC W/AUTO DIFF WBC: CPT | Mod: ER | Performed by: EMERGENCY MEDICINE

## 2023-08-27 PROCEDURE — 84484 ASSAY OF TROPONIN QUANT: CPT | Mod: ER | Performed by: EMERGENCY MEDICINE

## 2023-08-27 PROCEDURE — 93005 ELECTROCARDIOGRAM TRACING: CPT | Mod: ER

## 2023-08-27 PROCEDURE — 93010 ELECTROCARDIOGRAM REPORT: CPT | Mod: ,,, | Performed by: INTERNAL MEDICINE

## 2023-08-27 PROCEDURE — 83880 ASSAY OF NATRIURETIC PEPTIDE: CPT | Mod: ER | Performed by: EMERGENCY MEDICINE

## 2023-08-27 PROCEDURE — 99285 EMERGENCY DEPT VISIT HI MDM: CPT | Mod: 25,ER

## 2023-08-27 PROCEDURE — 94761 N-INVAS EAR/PLS OXIMETRY MLT: CPT | Mod: ER

## 2023-08-27 PROCEDURE — 93010 EKG 12-LEAD: ICD-10-PCS | Mod: ,,, | Performed by: INTERNAL MEDICINE

## 2023-08-27 RX ORDER — METHYLPREDNISOLONE 4 MG/1
TABLET ORAL
Qty: 1 EACH | Refills: 0 | OUTPATIENT
Start: 2023-08-27 | End: 2024-03-11

## 2023-08-27 RX ORDER — DEXAMETHASONE SODIUM PHOSPHATE 4 MG/ML
8 INJECTION, SOLUTION INTRA-ARTICULAR; INTRALESIONAL; INTRAMUSCULAR; INTRAVENOUS; SOFT TISSUE
Status: COMPLETED | OUTPATIENT
Start: 2023-08-27 | End: 2023-08-27

## 2023-08-27 RX ORDER — IPRATROPIUM BROMIDE AND ALBUTEROL SULFATE 2.5; .5 MG/3ML; MG/3ML
3 SOLUTION RESPIRATORY (INHALATION)
Status: COMPLETED | OUTPATIENT
Start: 2023-08-27 | End: 2023-08-27

## 2023-08-27 RX ADMIN — IPRATROPIUM BROMIDE AND ALBUTEROL SULFATE 3 ML: .5; 3 SOLUTION RESPIRATORY (INHALATION) at 11:08

## 2023-08-27 RX ADMIN — DEXAMETHASONE SODIUM PHOSPHATE 8 MG: 4 INJECTION INTRA-ARTICULAR; INTRALESIONAL; INTRAMUSCULAR; INTRAVENOUS; SOFT TISSUE at 11:08

## 2023-08-27 NOTE — ED PROVIDER NOTES
Encounter Date: 8/27/2023       History     Chief Complaint   Patient presents with    Shortness of Breath     Hx of Asthma, taking breathing treatments since last night with no improvement.      Long history of asthma, last steroid use about a year ago, not hospitalized.  Trying to quit smoking, last cigarette about 2 weeks ago.  Current asthma flare for a few days, increasing her home nebulizer use, now has used it about 3 times in the last 18 hours without adequate relief from cough and wheezing.  Heat and exertion make her feel worse.  No fever, chills, sweats, or significant sputum production.  No chest pain.  No other complaints.    The history is provided by the patient and the spouse. No  was used.     Review of patient's allergies indicates:  No Known Allergies  History reviewed. No pertinent past medical history.  Past Surgical History:   Procedure Laterality Date    COLONOSCOPY N/A 02/23/2022    Procedure: COLONOSCOPY;  Surgeon: Pattie Quiroz MD;  Location: [facility];  Service: Endoscopy;  Laterality: N/A;    ESOPHAGOGASTRODUODENOSCOPY N/A 02/23/2022    Procedure: EGD (ESOPHAGOGASTRODUODENOSCOPY);  Surgeon: Pattie Quiroz MD;  Location: [facility];  Service: Endoscopy;  Laterality: N/A;    ESOPHAGOGASTRODUODENOSCOPY N/A 5/2/2022    Procedure: EGD (ESOPHAGOGASTRODUODENOSCOPY);  Surgeon: Trish Stauffer MD;  Location: [facility];  Service: Endoscopy;  Laterality: N/A;    TUBAL LIGATION       History reviewed. No pertinent family history.  Social History     Tobacco Use    Smoking status: Every Day     Types: Cigarettes    Smokeless tobacco: Never   Substance Use Topics    Alcohol use: Not Currently    Drug use: Not Currently     Types: Marijuana, Methamphetamines     Review of Systems   Constitutional:  Negative for activity change, fatigue and fever.   HENT:  Negative for congestion, ear pain, facial swelling, nosebleeds, sinus pressure and sore throat.    Eyes:   Negative for pain, discharge, redness and visual disturbance.   Respiratory:  Positive for cough, shortness of breath and wheezing. Negative for choking and chest tightness.    Cardiovascular:  Negative for chest pain, palpitations and leg swelling.   Gastrointestinal:  Negative for abdominal distention, abdominal pain, nausea and vomiting.   Endocrine: Negative for heat intolerance, polydipsia and polyuria.   Genitourinary:  Negative for difficulty urinating, dysuria, flank pain, hematuria and urgency.   Musculoskeletal:  Negative for back pain, gait problem, joint swelling and myalgias.   Skin:  Negative for color change and rash.   Allergic/Immunologic: Negative for environmental allergies and food allergies.   Neurological:  Negative for dizziness, weakness, numbness and headaches.   Hematological:  Negative for adenopathy. Does not bruise/bleed easily.   Psychiatric/Behavioral:  Negative for agitation and behavioral problems. The patient is not nervous/anxious.    All other systems reviewed and are negative.      Physical Exam     Initial Vitals [08/27/23 1015]   BP Pulse Resp Temp SpO2   (!) 173/78 108 (!) 28 97.8 °F (36.6 °C) (!) 93 %      MAP       --         Physical Exam    Nursing note and vitals reviewed.  Constitutional: She appears well-developed and well-nourished. She is not diaphoretic. She appears distressed.   Mild respiratory discomfort, resting room air oxygen saturation 92-93%   HENT:   Head: Normocephalic and atraumatic.   Mouth/Throat: No oropharyngeal exudate.   Eyes: Conjunctivae and EOM are normal. Pupils are equal, round, and reactive to light. Right eye exhibits no discharge. Left eye exhibits no discharge. No scleral icterus.   Neck: Neck supple. No thyromegaly present. No tracheal deviation present. No JVD present.   Normal range of motion.  Cardiovascular:  Normal rate, regular rhythm, normal heart sounds and intact distal pulses.     Exam reveals no gallop and no friction rub.        No murmur heard.  Pulmonary/Chest: No stridor. No respiratory distress. She has wheezes. She has no rhonchi. She has no rales. She exhibits no tenderness.   Tight, diffuse expiratory wheezing with decreased air entry throughout prolonged expiratory phase.  Mild tachypnea.  Mild dyspnea at rest.  No ashlee respiratory distress.   Abdominal: Abdomen is soft. Bowel sounds are normal. She exhibits no distension and no mass. There is no abdominal tenderness. There is no rebound and no guarding.   Musculoskeletal:         General: No tenderness or edema. Normal range of motion.      Cervical back: Normal range of motion and neck supple.     Neurological: She is alert and oriented to person, place, and time. She has normal strength.   Skin: Skin is warm and dry. No rash and no abscess noted. No erythema.   Psychiatric: She has a normal mood and affect. Her behavior is normal. Judgment and thought content normal.         ED Course   Procedures  Labs Reviewed   CBC W/ AUTO DIFFERENTIAL - Abnormal; Notable for the following components:       Result Value    RBC 5.42 (*)     Hemoglobin 11.3 (*)     MCV 70 (*)     MCH 20.8 (*)     MCHC 29.7 (*)     RDW 19.5 (*)     All other components within normal limits   COMPREHENSIVE METABOLIC PANEL - Abnormal; Notable for the following components:    Glucose 120 (*)     All other components within normal limits   B-TYPE NATRIURETIC PEPTIDE   TROPONIN I   SARS-COV-2 RDRP GENE   POCT INFLUENZA A/B MOLECULAR     EKG Readings: (Independently Interpreted)   Initial Reading: No STEMI. Rhythm: Normal Sinus Rhythm. Heart Rate: 87. Ectopy: No Ectopy.   Normal sinus rhythm at 87 beats per minute, noisy baseline, nonspecific T-wave abnormality.  No significant change from previous.       Imaging Results              X-Ray Chest AP Portable (Final result)  Result time 08/27/23 12:04:06      Final result by Jaylon Lopez III, MD (08/27/23 12:04:06)                   Impression:      No  acute abnormality.      Electronically signed by: Carlos Graysonmaribell  Date:    08/27/2023  Time:    12:04               Narrative:    EXAMINATION:  XR CHEST AP PORTABLE    CLINICAL HISTORY:  Asthma;.    TECHNIQUE:  Single frontal portable view of the chest was performed.    COMPARISON:  None    FINDINGS:  Support devices: None    The lungs are clear, with normal appearance of pulmonary vasculature and no pleural effusion or pneumothorax.    The cardiac silhouette is normal in size. The hilar and mediastinal contours are unremarkable.    Bones are intact.                                       Medications   albuterol-ipratropium 2.5 mg-0.5 mg/3 mL nebulizer solution 3 mL (3 mLs Nebulization Given 8/27/23 1139)   dexAMETHasone injection 8 mg (8 mg Intravenous Given 8/27/23 1137)       12:31 PM Feels much better, vital signs stable, still has moderate diffuse expiratory wheezing but moving air better and stable for outpatient management.  Counseled in detail.  Encouraged to keep up efforts to quit smoking.    Medical Decision Making  Problems Addressed:  Moderate persistent asthma with exacerbation: acute illness or injury    Amount and/or Complexity of Data Reviewed  Labs: ordered. Decision-making details documented in ED Course.  Radiology: ordered. Decision-making details documented in ED Course.  ECG/medicine tests: ordered and independent interpretation performed. Decision-making details documented in ED Course.    Risk  Prescription drug management.  Decision regarding hospitalization.      Additional MDM:   Differential Diagnosis:   Asthma exacerbation, pneumonia, upper respiratory infection, COVID                             Clinical Impression:   Final diagnoses:  [R06.02] SOB (shortness of breath)  [J45.41] Moderate persistent asthma with exacerbation (Primary)  [Z72.0] Tobacco abuse        ED Disposition Condition    Discharge Stable          ED Prescriptions       Medication Sig Dispense Start Date End Date  Auth. Provider    methylPREDNISolone (MEDROL DOSEPACK) 4 mg tablet As per package label 1 each 8/27/2023 -- Austyn Austin MD          Follow-up Information       Follow up With Specialties Details Why Contact Info    [facility] Emergency Dept Emergency Medicine  As needed [address]  [facility] [address]  [phone]             Austyn Austin MD  08/27/23 1534

## 2023-09-27 ENCOUNTER — HOSPITAL ENCOUNTER (EMERGENCY)
Facility: HOSPITAL | Age: 58
Discharge: HOME | End: 2023-09-27
Attending: EMERGENCY MEDICINE
Payer: MEDICAID

## 2023-09-27 VITALS
RESPIRATION RATE: 19 BRPM | TEMPERATURE: 99 F | SYSTOLIC BLOOD PRESSURE: 163 MMHG | WEIGHT: 224.56 LBS | BODY MASS INDEX: 35.25 KG/M2 | HEIGHT: 67 IN | DIASTOLIC BLOOD PRESSURE: 72 MMHG | OXYGEN SATURATION: 95 % | HEART RATE: 88 BPM

## 2023-09-27 DIAGNOSIS — T14.8XXA PUNCTURE WOUND: Primary | ICD-10-CM

## 2023-09-27 PROCEDURE — 99284 EMERGENCY DEPT VISIT MOD MDM: CPT | Mod: ER

## 2023-09-27 PROCEDURE — 63600175 PHARM REV CODE 636 W HCPCS: Mod: ER | Performed by: EMERGENCY MEDICINE

## 2023-09-27 PROCEDURE — 90471 IMMUNIZATION ADMIN: CPT | Mod: ER | Performed by: EMERGENCY MEDICINE

## 2023-09-27 PROCEDURE — 90715 TDAP VACCINE 7 YRS/> IM: CPT | Mod: ER | Performed by: EMERGENCY MEDICINE

## 2023-09-27 RX ORDER — CLINDAMYCIN HYDROCHLORIDE 150 MG/1
450 CAPSULE ORAL EVERY 8 HOURS
Qty: 45 CAPSULE | Refills: 0 | Status: SHIPPED | OUTPATIENT
Start: 2023-09-27 | End: 2023-10-02

## 2023-09-27 RX ADMIN — TETANUS TOXOID, REDUCED DIPHTHERIA TOXOID AND ACELLULAR PERTUSSIS VACCINE, ADSORBED 0.5 ML: 5; 2.5; 8; 8; 2.5 SUSPENSION INTRAMUSCULAR at 12:09

## 2023-09-27 NOTE — ED PROVIDER NOTES
Encounter Date: 9/27/2023       History   No chief complaint on file.    STepped on a nail yesterday through her shoe, and called her doctor this morning to get a tetanus shot, but they don't have it.      The history is provided by the patient.     Review of patient's allergies indicates:  No Known Allergies  No past medical history on file.  Past Surgical History:   Procedure Laterality Date    COLONOSCOPY N/A 02/23/2022    Procedure: COLONOSCOPY;  Surgeon: Pattie Quiroz MD;  Location: [facility];  Service: Endoscopy;  Laterality: N/A;    ESOPHAGOGASTRODUODENOSCOPY N/A 02/23/2022    Procedure: EGD (ESOPHAGOGASTRODUODENOSCOPY);  Surgeon: Pattie Quiroz MD;  Location: [facility];  Service: Endoscopy;  Laterality: N/A;    ESOPHAGOGASTRODUODENOSCOPY N/A 5/2/2022    Procedure: EGD (ESOPHAGOGASTRODUODENOSCOPY);  Surgeon: Trish Stauffer MD;  Location: [facility];  Service: Endoscopy;  Laterality: N/A;    TUBAL LIGATION       No family history on file.  Social History     Tobacco Use    Smoking status: Every Day     Types: Cigarettes    Smokeless tobacco: Never   Substance Use Topics    Alcohol use: Not Currently    Drug use: Not Currently     Types: Marijuana, Methamphetamines     Review of Systems   Constitutional:  Negative for fever.   HENT:  Negative for sore throat.    Respiratory:  Negative for shortness of breath.    Cardiovascular:  Negative for chest pain.   Gastrointestinal:  Negative for nausea.   Genitourinary:  Negative for dysuria.   Musculoskeletal:  Negative for back pain.   Skin:  Negative for rash.   Neurological:  Negative for weakness.   Hematological:  Does not bruise/bleed easily.       Physical Exam     Initial Vitals   BP Pulse Resp Temp SpO2   -- -- -- -- --      MAP       --         Physical Exam    Nursing note and vitals reviewed.  Constitutional: She appears well-developed and well-nourished. No distress.   HENT:   Head: Normocephalic and atraumatic.   Mouth/Throat:  Oropharynx is clear and moist.   Eyes: Conjunctivae and EOM are normal. Pupils are equal, round, and reactive to light.   Neck: Neck supple.   Normal range of motion.  Cardiovascular:  Normal rate, regular rhythm and normal heart sounds.     Exam reveals no gallop and no friction rub.       No murmur heard.  Pulmonary/Chest: Breath sounds normal. No respiratory distress. She has no wheezes. She has no rhonchi. She has no rales.   Abdominal: Abdomen is soft. Bowel sounds are normal. She exhibits no distension and no mass. There is no abdominal tenderness. There is no rebound and no guarding.   Musculoskeletal:         General: No tenderness or edema. Normal range of motion.      Cervical back: Normal range of motion and neck supple.     Neurological: She is alert and oriented to person, place, and time. She has normal strength.   Skin: Skin is warm and dry. No rash noted.   Small puncture wound to the plantar surface of right foot.  No active bleeding, no erythema,w armth or foreign body.    Psychiatric: She has a normal mood and affect. Thought content normal.         ED Course   Procedures  Labs Reviewed - No data to display       Imaging Results    None          Medications   Tdap (BOOSTRIX) vaccine injection 0.5 mL (has no administration in time range)     Medical Decision Making  Puncture wound sustained yesterday  DDx: puncture wound, cellutlitis    Risk  Prescription drug management.                               Clinical Impression:   Final diagnoses:  [T14.8XXA] Puncture wound (Primary)        ED Disposition Condition    Discharge Stable          ED Prescriptions       Medication Sig Dispense Start Date End Date Auth. Provider    clindamycin (CLEOCIN) 150 MG capsule Take 3 capsules (450 mg total) by mouth every 8 (eight) hours. for 5 days 45 capsule 9/27/2023 10/2/2023 Edvin Cash MD          Follow-up Information       Follow up With Specialties Details Why Contact Info    [place]   Call in 1 day  [id number]  [place] [address]  [phone]               Edvin Cash MD  09/27/23 6620

## 2023-09-27 NOTE — ED NOTES
Patient examined, evaluated, and educated on discharge prescriptions and instructions by MD Shailesh. Patient discharged to [facility] by BRENDA Bejarano.

## 2024-03-11 ENCOUNTER — HOSPITAL ENCOUNTER (EMERGENCY)
Facility: HOSPITAL | Age: 59
Discharge: HOME | End: 2024-03-11
Attending: EMERGENCY MEDICINE
Payer: MEDICAID

## 2024-03-11 VITALS
BODY MASS INDEX: 35.63 KG/M2 | HEART RATE: 100 BPM | DIASTOLIC BLOOD PRESSURE: 64 MMHG | SYSTOLIC BLOOD PRESSURE: 130 MMHG | TEMPERATURE: 98 F | OXYGEN SATURATION: 93 % | WEIGHT: 227.5 LBS | RESPIRATION RATE: 21 BRPM

## 2024-03-11 DIAGNOSIS — R05.9 COUGH: ICD-10-CM

## 2024-03-11 DIAGNOSIS — J45.41 MODERATE PERSISTENT ASTHMA WITH ACUTE EXACERBATION: Primary | ICD-10-CM

## 2024-03-11 DIAGNOSIS — J20.8 ACUTE BACTERIAL BRONCHITIS: ICD-10-CM

## 2024-03-11 DIAGNOSIS — B96.89 ACUTE BACTERIAL BRONCHITIS: ICD-10-CM

## 2024-03-11 DIAGNOSIS — I10 SEVERE UNCONTROLLED HYPERTENSION: ICD-10-CM

## 2024-03-11 LAB
ALBUMIN SERPL BCP-MCNC: 4.1 G/DL (ref 3.5–5.2)
ALP SERPL-CCNC: 88 U/L (ref 55–135)
ALT SERPL W/O P-5'-P-CCNC: 17 U/L (ref 10–44)
ANION GAP SERPL CALC-SCNC: 11 MMOL/L (ref 8–16)
AST SERPL-CCNC: 19 U/L (ref 10–40)
BASOPHILS # BLD AUTO: 0.14 K/UL (ref 0–0.2)
BASOPHILS NFR BLD: 1.2 % (ref 0–1.9)
BILIRUB SERPL-MCNC: 0.5 MG/DL (ref 0.1–1)
BUN SERPL-MCNC: 13 MG/DL (ref 6–20)
CALCIUM SERPL-MCNC: 9.7 MG/DL (ref 8.7–10.5)
CHLORIDE SERPL-SCNC: 105 MMOL/L (ref 95–110)
CO2 SERPL-SCNC: 25 MMOL/L (ref 23–29)
CREAT SERPL-MCNC: 0.8 MG/DL (ref 0.5–1.4)
DIFFERENTIAL METHOD BLD: ABNORMAL
EOSINOPHIL # BLD AUTO: 0.8 K/UL (ref 0–0.5)
EOSINOPHIL NFR BLD: 7.1 % (ref 0–8)
ERYTHROCYTE [DISTWIDTH] IN BLOOD BY AUTOMATED COUNT: 18 % (ref 11.5–14.5)
EST. GFR  (NO RACE VARIABLE): >60 ML/MIN/1.73 M^2
GLUCOSE SERPL-MCNC: 107 MG/DL (ref 70–110)
HCT VFR BLD AUTO: 43.6 % (ref 37–48.5)
HEP C VIRUS HOLD SPECIMEN: NORMAL
HGB BLD-MCNC: 13.3 G/DL (ref 12–16)
IMM GRANULOCYTES # BLD AUTO: 0.04 K/UL (ref 0–0.04)
IMM GRANULOCYTES NFR BLD AUTO: 0.4 % (ref 0–0.5)
LYMPHOCYTES # BLD AUTO: 2.4 K/UL (ref 1–4.8)
LYMPHOCYTES NFR BLD: 21.5 % (ref 18–48)
MCH RBC QN AUTO: 23.1 PG (ref 27–31)
MCHC RBC AUTO-ENTMCNC: 30.5 G/DL (ref 32–36)
MCV RBC AUTO: 76 FL (ref 82–98)
MONOCYTES # BLD AUTO: 0.9 K/UL (ref 0.3–1)
MONOCYTES NFR BLD: 7.6 % (ref 4–15)
NEUTROPHILS # BLD AUTO: 7.1 K/UL (ref 1.8–7.7)
NEUTROPHILS NFR BLD: 62.2 % (ref 38–73)
NRBC BLD-RTO: 0 /100 WBC
PLATELET # BLD AUTO: 239 K/UL (ref 150–450)
PMV BLD AUTO: 10.3 FL (ref 9.2–12.9)
POTASSIUM SERPL-SCNC: 3.8 MMOL/L (ref 3.5–5.1)
PROT SERPL-MCNC: 7.7 G/DL (ref 6–8.4)
RBC # BLD AUTO: 5.75 M/UL (ref 4–5.4)
SODIUM SERPL-SCNC: 141 MMOL/L (ref 136–145)
TROPONIN I SERPL DL<=0.01 NG/ML-MCNC: <0.006 NG/ML (ref 0–0.03)
WBC # BLD AUTO: 11.34 K/UL (ref 3.9–12.7)

## 2024-03-11 PROCEDURE — 84484 ASSAY OF TROPONIN QUANT: CPT | Mod: ER | Performed by: EMERGENCY MEDICINE

## 2024-03-11 PROCEDURE — 80053 COMPREHEN METABOLIC PANEL: CPT | Mod: ER | Performed by: EMERGENCY MEDICINE

## 2024-03-11 PROCEDURE — 93005 ELECTROCARDIOGRAM TRACING: CPT | Mod: ER

## 2024-03-11 PROCEDURE — 96374 THER/PROPH/DIAG INJ IV PUSH: CPT | Mod: ER

## 2024-03-11 PROCEDURE — 63600175 PHARM REV CODE 636 W HCPCS: Mod: ER | Performed by: EMERGENCY MEDICINE

## 2024-03-11 PROCEDURE — 94640 AIRWAY INHALATION TREATMENT: CPT | Mod: ER

## 2024-03-11 PROCEDURE — 86803 HEPATITIS C AB TEST: CPT | Performed by: EMERGENCY MEDICINE

## 2024-03-11 PROCEDURE — 25000003 PHARM REV CODE 250: Mod: ER | Performed by: EMERGENCY MEDICINE

## 2024-03-11 PROCEDURE — 87389 HIV-1 AG W/HIV-1&-2 AB AG IA: CPT | Performed by: EMERGENCY MEDICINE

## 2024-03-11 PROCEDURE — 25000242 PHARM REV CODE 250 ALT 637 W/ HCPCS: Mod: ER | Performed by: EMERGENCY MEDICINE

## 2024-03-11 PROCEDURE — 85025 COMPLETE CBC W/AUTO DIFF WBC: CPT | Mod: ER | Performed by: EMERGENCY MEDICINE

## 2024-03-11 PROCEDURE — 93010 ELECTROCARDIOGRAM REPORT: CPT | Mod: ,,, | Performed by: INTERNAL MEDICINE

## 2024-03-11 PROCEDURE — 94761 N-INVAS EAR/PLS OXIMETRY MLT: CPT | Mod: ER

## 2024-03-11 PROCEDURE — 99285 EMERGENCY DEPT VISIT HI MDM: CPT | Mod: 25,ER

## 2024-03-11 RX ORDER — IPRATROPIUM BROMIDE AND ALBUTEROL SULFATE 2.5; .5 MG/3ML; MG/3ML
3 SOLUTION RESPIRATORY (INHALATION)
Status: COMPLETED | OUTPATIENT
Start: 2024-03-11 | End: 2024-03-11

## 2024-03-11 RX ORDER — ASPIRIN 325 MG
325 TABLET ORAL
Status: COMPLETED | OUTPATIENT
Start: 2024-03-11 | End: 2024-03-11

## 2024-03-11 RX ORDER — AZITHROMYCIN 250 MG/1
250 TABLET, FILM COATED ORAL DAILY
Qty: 6 TABLET | Refills: 0 | Status: SHIPPED | OUTPATIENT
Start: 2024-03-11

## 2024-03-11 RX ORDER — ALBUTEROL SULFATE 90 UG/1
1-2 AEROSOL, METERED RESPIRATORY (INHALATION) EVERY 6 HOURS PRN
Qty: 6.7 G | Refills: 0 | Status: SHIPPED | OUTPATIENT
Start: 2024-03-11 | End: 2025-03-11

## 2024-03-11 RX ORDER — ALBUTEROL SULFATE 2.5 MG/.5ML
2.5 SOLUTION RESPIRATORY (INHALATION) EVERY 4 HOURS PRN
Qty: 10 EACH | Refills: 0 | Status: SHIPPED | OUTPATIENT
Start: 2024-03-11 | End: 2025-03-11

## 2024-03-11 RX ORDER — PREDNISONE 10 MG/1
10 TABLET ORAL DAILY
Qty: 21 TABLET | Refills: 0 | OUTPATIENT
Start: 2024-03-11 | End: 2024-04-05

## 2024-03-11 RX ORDER — METHYLPREDNISOLONE SOD SUCC 125 MG
125 VIAL (EA) INJECTION
Status: COMPLETED | OUTPATIENT
Start: 2024-03-11 | End: 2024-03-11

## 2024-03-11 RX ORDER — MONTELUKAST SODIUM 10 MG/1
10 TABLET ORAL NIGHTLY
Qty: 30 TABLET | Refills: 0 | Status: SHIPPED | OUTPATIENT
Start: 2024-03-11 | End: 2024-04-10

## 2024-03-11 RX ADMIN — IPRATROPIUM BROMIDE AND ALBUTEROL SULFATE 3 ML: 2.5; .5 SOLUTION RESPIRATORY (INHALATION) at 10:03

## 2024-03-11 RX ADMIN — ASPIRIN 325 MG: 325 TABLET ORAL at 09:03

## 2024-03-11 RX ADMIN — METHYLPREDNISOLONE SODIUM SUCCINATE 125 MG: 125 INJECTION, POWDER, FOR SOLUTION INTRAMUSCULAR; INTRAVENOUS at 09:03

## 2024-03-12 LAB
HCV AB SERPL QL IA: NEGATIVE
HIV 1+2 AB+HIV1 P24 AG SERPL QL IA: NEGATIVE
OHS QRS DURATION: 88 MS
OHS QTC CALCULATION: 446 MS

## 2024-03-12 NOTE — ED PROVIDER NOTES
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 She has a hx of asthma and states she's been taking more breathing tx at home than usual. Also c/o headache and tachycardia.      HPI:  Sheryl Ortiz is a 58 y.o. female with PMH as below who presents to the [facility] emergency department for evaluation of 7 days of gradual onset, worsening, moderate to severe SOB with cough productive of yellow sputum, chest tightness, HTN, and headache. No known sick contacts with similar symptoms.  She has no other complaints.     PCP: No, Primary Doctor    Review of patient's allergies indicates:  No Known Allergies   History reviewed. No pertinent past medical history.  Past Surgical History:   Procedure Laterality Date    COLONOSCOPY N/A 02/23/2022    Procedure: COLONOSCOPY;  Surgeon: Pattie Quiroz MD;  Location: [facility];  Service: Endoscopy;  Laterality: N/A;    ESOPHAGOGASTRODUODENOSCOPY N/A 02/23/2022    Procedure: EGD (ESOPHAGOGASTRODUODENOSCOPY);  Surgeon: Pattie Quiroz MD;  Location: [facility];  Service: Endoscopy;  Laterality: N/A;    ESOPHAGOGASTRODUODENOSCOPY N/A 05/02/2022    Procedure: EGD (ESOPHAGOGASTRODUODENOSCOPY);  Surgeon: Trish Stauffer MD;  Location: [facility];  Service: Endoscopy;  Laterality: N/A;    TUBAL LIGATION         No family history on file.  Social History     Tobacco Use    Smoking status: Former     Types: Cigarettes    Smokeless tobacco: Never   Substance and Sexual Activity    Alcohol use: Yes     Comment: Socially    Drug use: Not Currently     Types: Marijuana, Methamphetamines    Sexual activity: Yes     Partners: Male      Review of Systems     Review of Systems   Constitutional: Negative.  Negative for fever.   HENT: Negative.     Eyes: Negative.    Respiratory:  Positive for cough, shortness of breath and wheezing.    Cardiovascular:   Positive for chest pain.   Gastrointestinal: Negative.    Endocrine: Negative.    Genitourinary: Negative.    Musculoskeletal: Negative.    Skin: Negative.    Allergic/Immunologic: Negative.    Neurological: Negative.    Hematological: Negative.    Psychiatric/Behavioral: Negative.     All other systems reviewed and are negative.       Physical Exam     Initial Vitals [03/11/24 2033]   BP Pulse Resp Temp SpO2   (!) 192/108 97 (!) 24 98 °F (36.7 °C) 96 %      MAP       --          Nursing notes and vital signs reviewed.  Constitutional: Patient is in moderate acute distress.   Head: Normocephalic. Atraumatic.   Eyes:  Conjunctivae are not pale. No scleral icterus.   ENT: Mucous membranes moist.   Neck: Supple.   Cardiovascular: Regular rate. Regular rhythm. No murmurs, rubs, or gallops   Pulmonary: Tachypnea. Diffuse severe insp and exp wheezing. Difficulty completing a sentence.   Abdominal: Non-distended.   Musculoskeletal: Moves all extremities. No obvious deformities.   Skin: Warm and dry.   Neurological:  Alert, awake, and appropriate. Normal speech. No acute lateralizing neurologic deficits appreciated.   Psychiatric: Normal affect.       ED Course   Critical Care    Date/Time: 3/11/2024 9:28 PM    Performed by: Efe Cardoso MD  Authorized by: Efe Cardoso MD  Direct patient critical care time: 15 minutes  Additional history critical care time: 5 minutes  Ordering / reviewing critical care time: 7 minutes  Documentation critical care time: 7 minutes  Total critical care time (exclusive of procedural time) : 34 minutes  Critical care time was exclusive of separately billable procedures and treating other patients and teaching time.  Critical care was necessary to treat or prevent imminent or life-threatening deterioration of the following conditions: respiratory failure (severe asthma exacerbation).  Critical care was time spent personally by me on the following activities: blood draw for  specimens, development of treatment plan with patient or surrogate, interpretation of cardiac output measurements, evaluation of patient's response to treatment, examination of patient, obtaining history from patient or surrogate, ordering and performing treatments and interventions, ordering and review of laboratory studies, ordering and review of radiographic studies, pulse oximetry, re-evaluation of patient's condition and review of old charts.        Vitals:    03/11/24 2033 03/11/24 2042 03/11/24 2117 03/11/24 2200   BP: (!) 192/108  131/63 (!) 149/74   Pulse: 97 87 81 80   Resp: (!) 24  (!) 24 (!) 23   Temp: 98 °F (36.7 °C)      TempSrc: Oral      SpO2: 96%  96% 95%   Weight: 103.2 kg (227 lb 8.2 oz)       03/11/24 2206 03/11/24 2212 03/11/24 2240 03/11/24 2246   BP:    (!) 140/67   Pulse: 78 80 102 99   Resp: 16 16 (!) 25 (!) 28   Temp:       TempSrc:       SpO2: 95% 95%  (!) 91%   Weight:        03/11/24 2302   BP: 130/64   Pulse: 100   Resp: (!) 21   Temp:    TempSrc:    SpO2: (!) 93%   Weight:      Lab Results Interpreted as Abnormal:  Labs Reviewed   CBC W/ AUTO DIFFERENTIAL - Abnormal; Notable for the following components:       Result Value    RBC 5.75 (*)     MCV 76 (*)     MCH 23.1 (*)     MCHC 30.5 (*)     RDW 18.0 (*)     Eos # 0.8 (*)     All other components within normal limits   COMPREHENSIVE METABOLIC PANEL   TROPONIN I   HIV 1 / 2 ANTIBODY    Narrative:     Release to patient->Immediate   HEPATITIS C ANTIBODY    Narrative:     Release to patient->Immediate   HEP C VIRUS HOLD SPECIMEN    Narrative:     Release to patient->Immediate      All Lab Results:  Results for orders placed or performed during the hospital encounter of 03/11/24   CBC auto differential   Result Value Ref Range    WBC 11.34 3.90 - 12.70 K/uL    RBC 5.75 (H) 4.00 - 5.40 M/uL    Hemoglobin 13.3 12.0 - 16.0 g/dL    Hematocrit 43.6 37.0 - 48.5 %    MCV 76 (L) 82 - 98 fL    MCH 23.1 (L) 27.0 - 31.0 pg    MCHC 30.5 (L) 32.0 -  36.0 g/dL    RDW 18.0 (H) 11.5 - 14.5 %    Platelets 239 150 - 450 K/uL    MPV 10.3 9.2 - 12.9 fL    Immature Granulocytes 0.4 0.0 - 0.5 %    Gran # (ANC) 7.1 1.8 - 7.7 K/uL    Immature Grans (Abs) 0.04 0.00 - 0.04 K/uL    Lymph # 2.4 1.0 - 4.8 K/uL    Mono # 0.9 0.3 - 1.0 K/uL    Eos # 0.8 (H) 0.0 - 0.5 K/uL    Baso # 0.14 0.00 - 0.20 K/uL    nRBC 0 0 /100 WBC    Gran % 62.2 38.0 - 73.0 %    Lymph % 21.5 18.0 - 48.0 %    Mono % 7.6 4.0 - 15.0 %    Eosinophil % 7.1 0.0 - 8.0 %    Basophil % 1.2 0.0 - 1.9 %    Differential Method Automated    Comprehensive metabolic panel   Result Value Ref Range    Sodium 141 136 - 145 mmol/L    Potassium 3.8 3.5 - 5.1 mmol/L    Chloride 105 95 - 110 mmol/L    CO2 25 23 - 29 mmol/L    Glucose 107 70 - 110 mg/dL    BUN 13 6 - 20 mg/dL    Creatinine 0.8 0.5 - 1.4 mg/dL    Calcium 9.7 8.7 - 10.5 mg/dL    Total Protein 7.7 6.0 - 8.4 g/dL    Albumin 4.1 3.5 - 5.2 g/dL    Total Bilirubin 0.5 0.1 - 1.0 mg/dL    Alkaline Phosphatase 88 55 - 135 U/L    AST 19 10 - 40 U/L    ALT 17 10 - 44 U/L    eGFR >60.0 >60 mL/min/1.73 m^2    Anion Gap 11 8 - 16 mmol/L   Troponin I   Result Value Ref Range    Troponin I <0.006 0.000 - 0.026 ng/mL   HIV 1/2 Ag/Ab (4th Gen)   Result Value Ref Range    HIV 1/2 Ag/Ab Negative Negative   Hepatitis C Antibody   Result Value Ref Range    Hepatitis C Ab Negative Negative   HCV Virus Hold Specimen   Result Value Ref Range    HEP C Virus Hold Specimen Hold for HCV sendout      Imaging Results              X-Ray Chest AP Portable (Final result)  Result time 03/11/24 21:44:47      Final result by Nicholas Maher MD (03/11/24 21:44:47)                   Impression:      No acute abnormality.      Electronically signed by: Nicholas Maher  Date:    03/11/2024  Time:    21:44               Narrative:    EXAMINATION:  XR CHEST AP PORTABLE    CLINICAL HISTORY:  Cough, unspecified    TECHNIQUE:  Single frontal view of the chest was  performed.    COMPARISON:  None    FINDINGS:  The lungs are clear, with normal appearance of pulmonary vasculature and no pleural effusion or pneumothorax.    The cardiac silhouette is prominent.  The hilar and mediastinal contours are unremarkable.    Bones are intact.                                       The EKG was ordered, reviewed, and independently interpreted by the ED Physician:  Rhythm: normal sinus  Rate: 83 bpm  No ST-T changes concerning for acute ischemia  Normal axis.   Normal intervals.      The emergency physician reviewed the vital signs and test results, which are outlined above.     ED Discussion        Patient's evaluation in the ED does not suggest any emergent or life-threatening medical conditions requiring immediate intervention beyond what was provided in the ED, and I believe patient is safe for discharge. Regardless, an unremarkable evaluation in the ED does not preclude the development or presence of a serious or life-threatening condition. As such, patient was given return instructions for any change or worsening of symptoms.           ED Medication(s) Administered:  Medications   albuterol-ipratropium 2.5 mg-0.5 mg/3 mL nebulizer solution 3 mL (3 mLs Nebulization Given 3/11/24 2212)   methylPREDNISolone sodium succinate injection 125 mg (125 mg Intravenous Given 3/11/24 2140)   aspirin tablet 325 mg (325 mg Oral Given 3/11/24 2140)       Prescription Management: I performed a review of the patient's current Rx medication list as input by nursing staff.    Discharge Medication List as of 3/11/2024 11:03 PM        START taking these medications    Details   albuterol sulfate 2.5 mg/0.5 mL Nebu Take 2.5 mg by nebulization every 4 (four) hours as needed (wheezing or shortness of breath). Rescue, Starting Mon 3/11/2024, Until Tue 3/11/2025 at 2359, Normal      azithromycin (Z-NASEEM) 250 MG tablet Take 1 tablet (250 mg total) by mouth once daily. Take first 2 tablets together, then 1 every day  until finished., Starting Mon 3/11/2024, Normal      predniSONE (DELTASONE) 10 MG tablet Take 1 tablet (10 mg total) by mouth once daily. Take 4 tabs x3 days, then 2 tabs x3 days, then 1 tab x3 days, Starting Mon 3/11/2024, Normal           CONTINUE these medications which have CHANGED    Details   albuterol (PROVENTIL/VENTOLIN HFA) 90 mcg/actuation inhaler Inhale 1-2 puffs into the lungs every 6 (six) hours as needed for Wheezing or Shortness of Breath. Rescue, Starting Mon 3/11/2024, Until Tue 3/11/2025 at 2359, Normal      montelukast (SINGULAIR) 10 mg tablet Take 1 tablet (10 mg total) by mouth nightly., Starting Mon 3/11/2024, Until Wed 4/10/2024, Normal           CONTINUE these medications which have NOT CHANGED    Details   pantoprazole (PROTONIX) 40 MG tablet TAKE ONE TABLET BY MOUTH ONCE DAILY, Normal      SYMBICORT 160-4.5 mcg/actuation HFAA Inhale 2 puffs into the lungs 2 (two) times daily., Starting Mon 3/21/2022, Historical Med           STOP taking these medications       hyoscyamine (LEVSIN/SL) 0.125 mg Subl Comments:   Reason for Stopping:         methylPREDNISolone (MEDROL DOSEPACK) 4 mg tablet Comments:   Reason for Stopping:                 Follow-up Information       with your pcp. Schedule an appointment as soon as possible for a visit in 2 days.               [facility] - Emergency Dept.    Specialty: Emergency Medicine  Why: As needed, If symptoms worsen  Contact information:  [address]  [facility] [address] [phone]                          Clinical Impression       ICD-10-CM ICD-9-CM   1. Moderate persistent asthma with acute exacerbation  J45.41 493.92   2. Severe uncontrolled hypertension  I10 401.9   3. Cough  R05.9 786.2   4. Acute bacterial bronchitis  J20.8 466.0    B96.89 041.9      ED Disposition Condition    Discharge Stable             Efe Cardoso MD  03/12/24 7848

## 2024-04-05 ENCOUNTER — HOSPITAL ENCOUNTER (EMERGENCY)
Facility: HOSPITAL | Age: 59
Discharge: HOME | End: 2024-04-05
Attending: EMERGENCY MEDICINE
Payer: MEDICAID

## 2024-04-05 ENCOUNTER — NURSE TRIAGE (OUTPATIENT)
Dept: ADMINISTRATIVE | Facility: CLINIC | Age: 59
End: 2024-04-05
Payer: MEDICAID

## 2024-04-05 VITALS
BODY MASS INDEX: 35.45 KG/M2 | RESPIRATION RATE: 20 BRPM | HEART RATE: 86 BPM | TEMPERATURE: 99 F | HEIGHT: 67 IN | SYSTOLIC BLOOD PRESSURE: 141 MMHG | DIASTOLIC BLOOD PRESSURE: 68 MMHG | OXYGEN SATURATION: 95 % | WEIGHT: 225.88 LBS

## 2024-04-05 DIAGNOSIS — J45.901 EXACERBATION OF ASTHMA, UNSPECIFIED ASTHMA SEVERITY, UNSPECIFIED WHETHER PERSISTENT: Primary | ICD-10-CM

## 2024-04-05 DIAGNOSIS — R06.02 SOB (SHORTNESS OF BREATH): ICD-10-CM

## 2024-04-05 LAB
ALBUMIN SERPL BCP-MCNC: 4 G/DL (ref 3.5–5.2)
ALP SERPL-CCNC: 89 U/L (ref 55–135)
ALT SERPL W/O P-5'-P-CCNC: 18 U/L (ref 10–44)
ANION GAP SERPL CALC-SCNC: 11 MMOL/L (ref 8–16)
AST SERPL-CCNC: 21 U/L (ref 10–40)
BASOPHILS # BLD AUTO: 0.08 K/UL (ref 0–0.2)
BASOPHILS NFR BLD: 1.1 % (ref 0–1.9)
BILIRUB SERPL-MCNC: 0.4 MG/DL (ref 0.1–1)
BNP SERPL-MCNC: 25 PG/ML (ref 0–99)
BUN SERPL-MCNC: 10 MG/DL (ref 6–20)
CALCIUM SERPL-MCNC: 9.5 MG/DL (ref 8.7–10.5)
CHLORIDE SERPL-SCNC: 105 MMOL/L (ref 95–110)
CO2 SERPL-SCNC: 24 MMOL/L (ref 23–29)
CREAT SERPL-MCNC: 0.7 MG/DL (ref 0.5–1.4)
CTP QC/QA: YES
CTP QC/QA: YES
DIFFERENTIAL METHOD BLD: ABNORMAL
EOSINOPHIL # BLD AUTO: 0.6 K/UL (ref 0–0.5)
EOSINOPHIL NFR BLD: 8.3 % (ref 0–8)
ERYTHROCYTE [DISTWIDTH] IN BLOOD BY AUTOMATED COUNT: 18.3 % (ref 11.5–14.5)
EST. GFR  (NO RACE VARIABLE): >60 ML/MIN/1.73 M^2
GLUCOSE SERPL-MCNC: 103 MG/DL (ref 70–110)
HCT VFR BLD AUTO: 41.5 % (ref 37–48.5)
HGB BLD-MCNC: 12.7 G/DL (ref 12–16)
IMM GRANULOCYTES # BLD AUTO: 0.02 K/UL (ref 0–0.04)
IMM GRANULOCYTES NFR BLD AUTO: 0.3 % (ref 0–0.5)
LYMPHOCYTES # BLD AUTO: 1.8 K/UL (ref 1–4.8)
LYMPHOCYTES NFR BLD: 24.2 % (ref 18–48)
MCH RBC QN AUTO: 23.5 PG (ref 27–31)
MCHC RBC AUTO-ENTMCNC: 30.6 G/DL (ref 32–36)
MCV RBC AUTO: 77 FL (ref 82–98)
MONOCYTES # BLD AUTO: 0.5 K/UL (ref 0.3–1)
MONOCYTES NFR BLD: 7.3 % (ref 4–15)
NEUTROPHILS # BLD AUTO: 4.3 K/UL (ref 1.8–7.7)
NEUTROPHILS NFR BLD: 58.8 % (ref 38–73)
NRBC BLD-RTO: 0 /100 WBC
OHS QRS DURATION: 82 MS
OHS QTC CALCULATION: 436 MS
PLATELET # BLD AUTO: 187 K/UL (ref 150–450)
PMV BLD AUTO: 10.5 FL (ref 9.2–12.9)
POC MOLECULAR INFLUENZA A AGN: NEGATIVE
POC MOLECULAR INFLUENZA B AGN: NEGATIVE
POTASSIUM SERPL-SCNC: 3.9 MMOL/L (ref 3.5–5.1)
PROT SERPL-MCNC: 7.1 G/DL (ref 6–8.4)
RBC # BLD AUTO: 5.41 M/UL (ref 4–5.4)
SARS-COV-2 RDRP RESP QL NAA+PROBE: NEGATIVE
SODIUM SERPL-SCNC: 140 MMOL/L (ref 136–145)
TROPONIN I SERPL DL<=0.01 NG/ML-MCNC: <0.006 NG/ML (ref 0–0.03)
WBC # BLD AUTO: 7.31 K/UL (ref 3.9–12.7)

## 2024-04-05 PROCEDURE — 87502 INFLUENZA DNA AMP PROBE: CPT | Mod: ER

## 2024-04-05 PROCEDURE — 80053 COMPREHEN METABOLIC PANEL: CPT | Mod: ER | Performed by: EMERGENCY MEDICINE

## 2024-04-05 PROCEDURE — 87635 SARS-COV-2 COVID-19 AMP PRB: CPT | Mod: ER | Performed by: EMERGENCY MEDICINE

## 2024-04-05 PROCEDURE — 93005 ELECTROCARDIOGRAM TRACING: CPT | Mod: ER

## 2024-04-05 PROCEDURE — 83880 ASSAY OF NATRIURETIC PEPTIDE: CPT | Mod: ER | Performed by: EMERGENCY MEDICINE

## 2024-04-05 PROCEDURE — 94640 AIRWAY INHALATION TREATMENT: CPT | Mod: ER,XB

## 2024-04-05 PROCEDURE — 93010 ELECTROCARDIOGRAM REPORT: CPT | Mod: ,,, | Performed by: INTERNAL MEDICINE

## 2024-04-05 PROCEDURE — 94761 N-INVAS EAR/PLS OXIMETRY MLT: CPT | Mod: ER

## 2024-04-05 PROCEDURE — 85025 COMPLETE CBC W/AUTO DIFF WBC: CPT | Mod: ER | Performed by: EMERGENCY MEDICINE

## 2024-04-05 PROCEDURE — 84484 ASSAY OF TROPONIN QUANT: CPT | Mod: ER | Performed by: EMERGENCY MEDICINE

## 2024-04-05 PROCEDURE — 99285 EMERGENCY DEPT VISIT HI MDM: CPT | Mod: 25,ER

## 2024-04-05 PROCEDURE — 96374 THER/PROPH/DIAG INJ IV PUSH: CPT | Mod: ER

## 2024-04-05 PROCEDURE — 25000242 PHARM REV CODE 250 ALT 637 W/ HCPCS: Mod: ER | Performed by: EMERGENCY MEDICINE

## 2024-04-05 PROCEDURE — 63600175 PHARM REV CODE 636 W HCPCS: Mod: ER | Performed by: EMERGENCY MEDICINE

## 2024-04-05 RX ORDER — METHYLPREDNISOLONE SOD SUCC 125 MG
125 VIAL (EA) INJECTION
Status: COMPLETED | OUTPATIENT
Start: 2024-04-05 | End: 2024-04-05

## 2024-04-05 RX ORDER — PREDNISONE 10 MG/1
40 TABLET ORAL DAILY
Qty: 28 TABLET | Refills: 0 | Status: SHIPPED | OUTPATIENT
Start: 2024-04-05 | End: 2024-04-12

## 2024-04-05 RX ORDER — IPRATROPIUM BROMIDE AND ALBUTEROL SULFATE 2.5; .5 MG/3ML; MG/3ML
3 SOLUTION RESPIRATORY (INHALATION)
Status: COMPLETED | OUTPATIENT
Start: 2024-04-05 | End: 2024-04-05

## 2024-04-05 RX ORDER — ALBUTEROL SULFATE 90 UG/1
2 AEROSOL, METERED RESPIRATORY (INHALATION) EVERY 4 HOURS PRN
Qty: 8 G | Refills: 0 | Status: SHIPPED | OUTPATIENT
Start: 2024-04-05

## 2024-04-05 RX ADMIN — IPRATROPIUM BROMIDE AND ALBUTEROL SULFATE 3 ML: .5; 3 SOLUTION RESPIRATORY (INHALATION) at 03:04

## 2024-04-05 RX ADMIN — METHYLPREDNISOLONE SODIUM SUCCINATE 125 MG: 125 INJECTION, POWDER, FOR SOLUTION INTRAMUSCULAR; INTRAVENOUS at 03:04

## 2024-04-05 RX ADMIN — IPRATROPIUM BROMIDE AND ALBUTEROL SULFATE 3 ML: .5; 3 SOLUTION RESPIRATORY (INHALATION) at 04:04

## 2024-04-05 NOTE — ED PROVIDER NOTES
Encounter Date: 4/5/2024       History     Chief Complaint   Patient presents with    Shortness of Breath     C/o sob for 1 week hx asthma but worse last few days     58-year-old female with past medical history of asthma presents to the emergency department with complaints of shortness of breath.  This started over the past 2 days, and was exacerbated today while cleaning her house.  Patient says that her at home breathing treatments are not working.  Patient denies any fever, chills, congestion, chest pain, numbness, weakness, abdominal pain, nausea, vomiting, diarrhea.    The history is provided by the patient.     Review of patient's allergies indicates:  No Known Allergies  No past medical history on file.  Past Surgical History:   Procedure Laterality Date    COLONOSCOPY N/A 02/23/2022    Procedure: COLONOSCOPY;  Surgeon: Pattie Quiroz MD;  Location: [facility];  Service: Endoscopy;  Laterality: N/A;    ESOPHAGOGASTRODUODENOSCOPY N/A 02/23/2022    Procedure: EGD (ESOPHAGOGASTRODUODENOSCOPY);  Surgeon: Pattie Quiroz MD;  Location: [facility];  Service: Endoscopy;  Laterality: N/A;    ESOPHAGOGASTRODUODENOSCOPY N/A 05/02/2022    Procedure: EGD (ESOPHAGOGASTRODUODENOSCOPY);  Surgeon: Trish Stauffer MD;  Location: [facility];  Service: Endoscopy;  Laterality: N/A;    TUBAL LIGATION       No family history on file.  Social History     Tobacco Use    Smoking status: Former     Types: Cigarettes    Smokeless tobacco: Never   Substance Use Topics    Alcohol use: Yes     Comment: Socially    Drug use: Not Currently     Types: Marijuana, Methamphetamines     Review of Systems   Constitutional:  Negative for diaphoresis and fever.   HENT:  Negative for congestion, dental problem and sore throat.    Eyes:  Negative for pain and visual disturbance.   Respiratory:  Positive for cough, shortness of breath and wheezing.    Cardiovascular:  Negative for chest pain and palpitations.   Gastrointestinal:   Negative for abdominal pain, diarrhea, nausea and vomiting.   Genitourinary:  Negative for dysuria and flank pain.   Musculoskeletal:  Negative for back pain and neck pain.   Skin:  Negative for rash and wound.   Neurological:  Negative for weakness, numbness and headaches.   Psychiatric/Behavioral:  Negative for agitation and confusion.        Physical Exam     Initial Vitals [04/05/24 1532]   BP Pulse Resp Temp SpO2   (!) 175/81 95 (!) 22 98.5 °F (36.9 °C) (!) 94 %      MAP       --         Physical Exam    Constitutional: She appears well-developed and well-nourished.   HENT:   Head: Normocephalic and atraumatic.   Eyes: EOM are normal. Pupils are equal, round, and reactive to light.   Neck: Neck supple.   Normal range of motion.  Cardiovascular:  Normal rate and regular rhythm.           Pulmonary/Chest: She is in respiratory distress. She has wheezes.   Tachypnea.  Increased work of breathing.  Diffuse expiratory wheezing.   Abdominal: She exhibits no distension. There is no abdominal tenderness.   Musculoskeletal:      Cervical back: Normal range of motion and neck supple.     Neurological: She is alert and oriented to person, place, and time.   Skin: Skin is warm and dry.   Psychiatric: She has a normal mood and affect.         ED Course   Procedures  Labs Reviewed   CBC W/ AUTO DIFFERENTIAL - Abnormal; Notable for the following components:       Result Value    RBC 5.41 (*)     MCV 77 (*)     MCH 23.5 (*)     MCHC 30.6 (*)     RDW 18.3 (*)     Eos # 0.6 (*)     Eosinophil % 8.3 (*)     All other components within normal limits   COMPREHENSIVE METABOLIC PANEL   B-TYPE NATRIURETIC PEPTIDE   TROPONIN I   SARS-COV-2 RDRP GENE   POCT INFLUENZA A/B MOLECULAR     EKG Readings: (Independently Interpreted)   Rate of 80 beats per minute.  Normal sinus rhythm.  Normal axis.  P.r., QRS and QTC intervals within normal limits.  No STEMI.     ECG Results              EKG 12-lead (Final result)        Collection Time  Result Time QRS Duration O[facility] QTC Calculation    04/05/24 15:56:12 04/05/24 16:36:51 82 436                     Final result by Interface, Lab [facility] (04/05/24 16:36:57)                   Narrative:    Test Reason : R06.02,    Vent. Rate : 082 BPM     Atrial Rate : 082 BPM     P-R Int : 154 ms          QRS Dur : 082 ms      QT Int : 374 ms       P-R-T Axes : 082 066 044 degrees     QTc Int : 436 ms    Normal sinus rhythm  Normal ECG  When compared with ECG of 11-MAR-2024 20:43,  No significant change was found  Confirmed by LAKESHIA MCKENZIE MD ([id number]) on 4/5/2024 4:36:50 PM    Referred By: SERA   SELF           Confirmed By:LAKESHIA MCKENZIE MD                                  Imaging Results              X-Ray Chest AP Portable (Final result)  Result time 04/05/24 15:59:48      Final result by Hamlet Clemente MD (04/05/24 15:59:48)                   Impression:      No acute findings.      Electronically signed by: Hamlet Clemente MD  Date:    04/05/2024  Time:    15:59               Narrative:    EXAMINATION:  XR CHEST AP PORTABLE    CLINICAL HISTORY:  SOB;    TECHNIQUE:  Single frontal view of the chest was performed.    COMPARISON:  03/11/2024    FINDINGS:  The cardiomediastinal silhouette is normal.    The lungs are clear.  No pleural effusions.    No acute osseous findings.  No advanced arthritic changes.                                       Medications   albuterol-ipratropium 2.5 mg-0.5 mg/3 mL nebulizer solution 3 mL (3 mLs Nebulization Given 4/5/24 1600)   methylPREDNISolone sodium succinate injection 125 mg (125 mg Intravenous Given 4/5/24 1558)     Medical Decision Making  Differential diagnosis includes asthma exacerbation, URI, allergic reaction, ACS, heart failure, pneumonia    Amount and/or Complexity of Data Reviewed  Labs: ordered.  Radiology: ordered. Decision-making details documented in ED Course.  ECG/medicine tests: ordered and independent interpretation performed. Decision-making details  documented in ED Course.    Risk  Prescription drug management.               ED Course as of 04/05/24 1750 Fri Apr 05, 2024 1653 4:53 PM Reassessment: I reassessed the pt.  The pt is resting comfortably and is NAD.  Pt states their sx have improved. Discussed test results, shared treatment plan, specific conditions for return, and the need for f/u.  Answered their questions at this time.  Pt understands and agrees to the plan.  The pt has remained hemodynamically stable through ED course and is stable for discharge.    [BA]      ED Course User Index  [BA] Jorje Polk MD                           Clinical Impression:  Final diagnoses:  [R06.02] SOB (shortness of breath)  [J45.901] Exacerbation of asthma, unspecified asthma severity, unspecified whether persistent (Primary)          ED Disposition Condition    Discharge Stable          ED Prescriptions       Medication Sig Dispense Start Date End Date Auth. Provider    albuterol (PROVENTIL/VENTOLIN HFA) 90 mcg/actuation inhaler Inhale 2 puffs into the lungs every 4 (four) hours as needed for Wheezing. Rescue 8 g 4/5/2024 -- Jorje Polk MD    predniSONE (DELTASONE) 10 MG tablet Take 4 tablets (40 mg total) by mouth once daily. for 7 days 28 tablet 4/5/2024 4/12/2024 Jorje Polk MD          Follow-up Information       Follow up With Specialties Details Why Contact Info    Your Primary Care Provider  Schedule an appointment as soon as possible for a visit in 2 days For re-evaluation and further treatment     [facility] - Emergency Dept Emergency Medicine Go today If symptoms worsen, For re-evaluation and further treatment, As needed [id number] [facility] [address]  [facility] [id number] [phone]             Jorje Polk MD  04/05/24 1750

## 2024-07-24 ENCOUNTER — HOSPITAL ENCOUNTER (EMERGENCY)
Facility: HOSPITAL | Age: 59
Discharge: HOME | End: 2024-07-24
Attending: EMERGENCY MEDICINE
Payer: MEDICAID

## 2024-07-24 VITALS
HEART RATE: 90 BPM | RESPIRATION RATE: 26 BRPM | OXYGEN SATURATION: 95 % | DIASTOLIC BLOOD PRESSURE: 75 MMHG | SYSTOLIC BLOOD PRESSURE: 129 MMHG | TEMPERATURE: 98 F | HEIGHT: 67 IN | BODY MASS INDEX: 35.98 KG/M2 | WEIGHT: 229.25 LBS

## 2024-07-24 DIAGNOSIS — J45.901 ASTHMA EXACERBATION: ICD-10-CM

## 2024-07-24 LAB
ALBUMIN SERPL BCP-MCNC: 4 G/DL (ref 3.5–5.2)
ALP SERPL-CCNC: 76 U/L (ref 55–135)
ALT SERPL W/O P-5'-P-CCNC: 19 U/L (ref 10–44)
ANION GAP SERPL CALC-SCNC: 11 MMOL/L (ref 8–16)
AST SERPL-CCNC: 24 U/L (ref 10–40)
BASOPHILS # BLD AUTO: 0.09 K/UL (ref 0–0.2)
BASOPHILS NFR BLD: 1.5 % (ref 0–1.9)
BILIRUB SERPL-MCNC: 0.4 MG/DL (ref 0.1–1)
BUN SERPL-MCNC: 16 MG/DL (ref 6–20)
CALCIUM SERPL-MCNC: 9.9 MG/DL (ref 8.7–10.5)
CHLORIDE SERPL-SCNC: 106 MMOL/L (ref 95–110)
CO2 SERPL-SCNC: 23 MMOL/L (ref 23–29)
CREAT SERPL-MCNC: 0.8 MG/DL (ref 0.5–1.4)
CTP QC/QA: YES
DIFFERENTIAL METHOD BLD: ABNORMAL
EOSINOPHIL # BLD AUTO: 0.5 K/UL (ref 0–0.5)
EOSINOPHIL NFR BLD: 7.7 % (ref 0–8)
ERYTHROCYTE [DISTWIDTH] IN BLOOD BY AUTOMATED COUNT: 18.2 % (ref 11.5–14.5)
EST. GFR  (NO RACE VARIABLE): >60 ML/MIN/1.73 M^2
GLUCOSE SERPL-MCNC: 128 MG/DL (ref 70–110)
HCT VFR BLD AUTO: 44.2 % (ref 37–48.5)
HGB BLD-MCNC: 14.2 G/DL (ref 12–16)
IMM GRANULOCYTES # BLD AUTO: 0.01 K/UL (ref 0–0.04)
IMM GRANULOCYTES NFR BLD AUTO: 0.2 % (ref 0–0.5)
LYMPHOCYTES # BLD AUTO: 1.1 K/UL (ref 1–4.8)
LYMPHOCYTES NFR BLD: 19.1 % (ref 18–48)
MAGNESIUM SERPL-MCNC: 2.1 MG/DL (ref 1.6–2.6)
MCH RBC QN AUTO: 26.2 PG (ref 27–31)
MCHC RBC AUTO-ENTMCNC: 32.1 G/DL (ref 32–36)
MCV RBC AUTO: 82 FL (ref 82–98)
MONOCYTES # BLD AUTO: 0.4 K/UL (ref 0.3–1)
MONOCYTES NFR BLD: 7.3 % (ref 4–15)
NEUTROPHILS # BLD AUTO: 3.8 K/UL (ref 1.8–7.7)
NEUTROPHILS NFR BLD: 64.2 % (ref 38–73)
NRBC BLD-RTO: 0 /100 WBC
OHS QRS DURATION: 86 MS
OHS QTC CALCULATION: 465 MS
PLATELET # BLD AUTO: 132 K/UL (ref 150–450)
PMV BLD AUTO: 11.3 FL (ref 9.2–12.9)
POTASSIUM SERPL-SCNC: 4 MMOL/L (ref 3.5–5.1)
PROT SERPL-MCNC: 7.2 G/DL (ref 6–8.4)
RBC # BLD AUTO: 5.42 M/UL (ref 4–5.4)
SARS-COV-2 RDRP RESP QL NAA+PROBE: NEGATIVE
SODIUM SERPL-SCNC: 140 MMOL/L (ref 136–145)
WBC # BLD AUTO: 5.87 K/UL (ref 3.9–12.7)

## 2024-07-24 PROCEDURE — 80053 COMPREHEN METABOLIC PANEL: CPT | Mod: ER | Performed by: EMERGENCY MEDICINE

## 2024-07-24 PROCEDURE — 99285 EMERGENCY DEPT VISIT HI MDM: CPT | Mod: 25,ER

## 2024-07-24 PROCEDURE — 63600175 PHARM REV CODE 636 W HCPCS: Mod: ER | Performed by: EMERGENCY MEDICINE

## 2024-07-24 PROCEDURE — 93005 ELECTROCARDIOGRAM TRACING: CPT | Mod: ER

## 2024-07-24 PROCEDURE — 94761 N-INVAS EAR/PLS OXIMETRY MLT: CPT | Mod: ER

## 2024-07-24 PROCEDURE — 93010 ELECTROCARDIOGRAM REPORT: CPT | Mod: ,,, | Performed by: INTERNAL MEDICINE

## 2024-07-24 PROCEDURE — 85025 COMPLETE CBC W/AUTO DIFF WBC: CPT | Mod: ER | Performed by: EMERGENCY MEDICINE

## 2024-07-24 PROCEDURE — 96374 THER/PROPH/DIAG INJ IV PUSH: CPT | Mod: ER

## 2024-07-24 PROCEDURE — 94640 AIRWAY INHALATION TREATMENT: CPT | Mod: ER,XB

## 2024-07-24 PROCEDURE — 83735 ASSAY OF MAGNESIUM: CPT | Mod: ER | Performed by: EMERGENCY MEDICINE

## 2024-07-24 PROCEDURE — 87635 SARS-COV-2 COVID-19 AMP PRB: CPT | Mod: ER | Performed by: EMERGENCY MEDICINE

## 2024-07-24 PROCEDURE — 25000242 PHARM REV CODE 250 ALT 637 W/ HCPCS: Mod: ER | Performed by: EMERGENCY MEDICINE

## 2024-07-24 RX ORDER — IPRATROPIUM BROMIDE AND ALBUTEROL SULFATE 2.5; .5 MG/3ML; MG/3ML
3 SOLUTION RESPIRATORY (INHALATION)
Status: COMPLETED | OUTPATIENT
Start: 2024-07-24 | End: 2024-07-24

## 2024-07-24 RX ORDER — METHYLPREDNISOLONE SOD SUCC 125 MG
125 VIAL (EA) INJECTION
Status: COMPLETED | OUTPATIENT
Start: 2024-07-24 | End: 2024-07-24

## 2024-07-24 RX ORDER — PREDNISONE 10 MG/1
TABLET ORAL
Qty: 21 TABLET | Refills: 0 | Status: SHIPPED | OUTPATIENT
Start: 2024-07-24

## 2024-07-24 RX ADMIN — IPRATROPIUM BROMIDE AND ALBUTEROL SULFATE 3 ML: 2.5; .5 SOLUTION RESPIRATORY (INHALATION) at 08:07

## 2024-07-24 RX ADMIN — METHYLPREDNISOLONE SODIUM SUCCINATE 125 MG: 125 INJECTION, POWDER, FOR SOLUTION INTRAMUSCULAR; INTRAVENOUS at 08:07

## 2024-07-24 NOTE — ED PROVIDER NOTES
Encounter Date: 7/24/2024       History     Chief Complaint   Patient presents with    Wheezing     58-year-old female with a history of asthma presents with shortness of breath and cough that started 5 days ago and worsened this morning at 2:00 a.m. She reports taking a total of 6 breathing treatments since 2:00 a.m. with no improvement.  Associated symptoms include rib pain, dizziness, lightheadedness, and palpitations.  She denies nausea, vomiting, chest pain, fever, tobacco use.    The history is provided by the patient.     Review of patient's allergies indicates:  No Known Allergies  No past medical history on file.  Past Surgical History:   Procedure Laterality Date    COLONOSCOPY N/A 02/23/2022    Procedure: COLONOSCOPY;  Surgeon: Pattie Quiroz MD;  Location: [facility];  Service: Endoscopy;  Laterality: N/A;    ESOPHAGOGASTRODUODENOSCOPY N/A 02/23/2022    Procedure: EGD (ESOPHAGOGASTRODUODENOSCOPY);  Surgeon: Pattie Quiroz MD;  Location: [facility];  Service: Endoscopy;  Laterality: N/A;    ESOPHAGOGASTRODUODENOSCOPY N/A 05/02/2022    Procedure: EGD (ESOPHAGOGASTRODUODENOSCOPY);  Surgeon: Trish Stauffer MD;  Location: [facility];  Service: Endoscopy;  Laterality: N/A;    TUBAL LIGATION       No family history on file.  Social History     Tobacco Use    Smoking status: Former     Types: Cigarettes    Smokeless tobacco: Never   Substance Use Topics    Alcohol use: Yes     Comment: Socially    Drug use: Not Currently     Types: Marijuana, Methamphetamines     Review of Systems   Constitutional:  Negative for fever.   Respiratory:  Positive for cough, shortness of breath and wheezing.    Cardiovascular:  Positive for palpitations. Negative for chest pain.   Gastrointestinal:  Negative for nausea and vomiting.   Neurological:  Positive for dizziness and light-headedness.       Physical Exam     Initial Vitals [07/24/24 0806]   BP Pulse Resp Temp SpO2   (!) 179/75 88 (!) 24 98.1 °F (36.7  °C) (S) (!) 94 %      MAP       --         Physical Exam    Vitals reviewed.  Constitutional: She appears well-developed and well-nourished. She is not diaphoretic. No distress.   HENT:   Head: Normocephalic and atraumatic.   Eyes: EOM are normal. No scleral icterus.   Neck: Neck supple.   Normal range of motion.  Cardiovascular:  Normal rate, regular rhythm and normal heart sounds.     Exam reveals no gallop and no friction rub.       No murmur heard.  Pulmonary/Chest:   Diminished lung sounds with biphasic wheezing.  Tachypneic.   Abdominal: Abdomen is soft. She exhibits no distension. There is no abdominal tenderness. There is no rebound and no guarding.   Musculoskeletal:         General: No tenderness or edema. Normal range of motion.      Cervical back: Normal range of motion and neck supple.     Neurological: She is alert and oriented to person, place, and time.   Skin: Skin is warm and dry.   Psychiatric: She has a normal mood and affect.         ED Course   Procedures  Labs Reviewed   CBC W/ AUTO DIFFERENTIAL - Abnormal       Result Value    WBC 5.87      RBC 5.42 (*)     Hemoglobin 14.2      Hematocrit 44.2      MCV 82      MCH 26.2 (*)     MCHC 32.1      RDW 18.2 (*)     Platelets 132 (*)     MPV 11.3      Immature Granulocytes 0.2      Gran # (ANC) 3.8      Immature Grans (Abs) 0.01      Lymph # 1.1      Mono # 0.4      Eos # 0.5      Baso # 0.09      nRBC 0      Gran % 64.2      Lymph % 19.1      Mono % 7.3      Eosinophil % 7.7      Basophil % 1.5      Differential Method Automated     COMPREHENSIVE METABOLIC PANEL - Abnormal    Sodium 140      Potassium 4.0      Chloride 106      CO2 23      Glucose 128 (*)     BUN 16      Creatinine 0.8      Calcium 9.9      Total Protein 7.2      Albumin 4.0      Total Bilirubin 0.4      Alkaline Phosphatase 76      AST 24      ALT 19      eGFR >60.0      Anion Gap 11     MAGNESIUM    Magnesium 2.1     SARS-COV-2 RDRP GENE    POC Rapid COVID Negative      Quality  Control Acceptable Yes       EKG Readings: (Independently Interpreted)   Normal sinus rhythm rate 79.  AR interval 154.  QRS 86.  .  Nonspecific T-wave changes.  No ST elevation or depression.  Normal axis.     ECG Results              EKG 12-lead (In process)        Collection Time Result Time QRS Duration OHS QTC Calculation    07/24/24 08:30:49 07/24/24 08:56:47 86 465                     In process by Interface, [facility] (07/24/24 08:56:56)                   Narrative:    Test Reason : J45.901,    Vent. Rate : 079 BPM     Atrial Rate : 079 BPM     P-R Int : 154 ms          QRS Dur : 086 ms      QT Int : 406 ms       P-R-T Axes : 061 050 026 degrees     QTc Int : 465 ms    Normal sinus rhythm  Nonspecific T wave abnormality  Prolonged QT  Abnormal ECG  When compared with ECG of 05-APR-2024 15:56,  Inverted T waves have replaced nonspecific T wave abnormality in Anterior  leads    Referred By: [email or web address]   SELF           Confirmed By:                                   Imaging Results              X-Ray Chest AP Portable (Final result)  Result time 07/24/24 08:47:53      Final result by Tez Maguire MD (07/24/24 08:47:53)                   Impression:      1.  Negative for acute process involving the chest.    2.  Stable findings as noted above.      Electronically signed by: Tez Maguire MD  Date:    07/24/2024  Time:    08:47               Narrative:    EXAMINATION:  XR CHEST AP PORTABLE    CLINICAL HISTORY:  sob;    COMPARISON:  Studies dating back to September 6, 2022    FINDINGS:  EKG leads overlie the chest, which is lordotic in position.  The study is slightly rotated to the right.  The lungs are clear. The cardiac silhouette size is normal. The trachea is midline and the mediastinal width is normal. Negative for focal infiltrate, effusion or pneumothorax. Pulmonary vasculature is normal. Negative for osseous abnormalities. Tortuous aorta with calcifications of the aortic knob.  There are  degenerative changes of the spine and both shoulder girdles.  Cardiophrenic fat pads.                                       Medications   methylPREDNISolone sodium succinate injection 125 mg (125 mg Intravenous Given 7/24/24 0842)   albuterol-ipratropium 2.5 mg-0.5 mg/3 mL nebulizer solution 3 mL (3 mLs Nebulization Given 7/24/24 0840)     Medical Decision Making  58-year-old female presents with acute asthma exacerbation.  COVID negative.  Chest x-ray negative for pneumonia.  CBC and CMP are unremarkable.  EKG shows no acute ischemic changes.  Lung sounds and symptoms improved after DuoNeb x3 in a loading dose of Solu-Medrol.  Ambulatory referral provided to pulmonology.  Discharged with prednisone taper.    Amount and/or Complexity of Data Reviewed  Labs: ordered. Decision-making details documented in ED Course.  Radiology: ordered and independent interpretation performed. Decision-making details documented in ED Course.  ECG/medicine tests: ordered and independent interpretation performed. Decision-making details documented in ED Course.    Risk  Prescription drug management.      Additional MDM:   Differential Diagnosis:   Differential diagnosis includes but is not limited to asthma exacerbation, pneumothorax, CHF, COVID-19, pneumonia, ACS.             ED Course as of 07/24/24 1003   Wed Jul 24, 2024   0917 WBC: 5.87 [NF]   0917 Hemoglobin: 14.2 [NF]   0917 Hematocrit: 44.2 [NF]   0917 Platelet Count(!): 132 [NF]   0917 Chest x-ray is negative for pneumonia, CHF, or pneumothorax. [NF]   0940 SARS-CoV-2 RNA, Amplification, Qual: Negative [NF]   0957 Magnesium : 2.1 [NF]   0957 Glucose(!): 128 [NF]      ED Course User Index  [NF] Marilu Bañuelos DO                           Clinical Impression:  Final diagnoses:  [J45.901] Asthma exacerbation          ED Disposition Condition    Discharge Stable          ED Prescriptions       Medication Sig Dispense Start Date End Date Auth. Provider    predniSONE (DELTASONE)  10 MG tablet Take 4 tabs x 3 days, then take 2 tabs x 3 days, then take 1 tab x 3 days. 21 tablet 7/24/2024 -- Marilu Bañuelos, DO          Follow-up Information       Follow up With Specialties Details Why Contact Info    [facility] - Emergency Dept Emergency Medicine  As needed, If symptoms worsen [address]  [place] [address]  [phone]             Marilu Bañuelos, DO  07/24/24 1009

## 2024-08-01 ENCOUNTER — OFFICE VISIT (OUTPATIENT)
Dept: PULMONOLOGY | Facility: CLINIC | Age: 59
End: 2024-08-01
Payer: MEDICAID

## 2024-08-01 DIAGNOSIS — F17.211 CIGARETTE NICOTINE DEPENDENCE IN REMISSION: ICD-10-CM

## 2024-08-01 DIAGNOSIS — J45.30 MILD PERSISTENT ASTHMA WITHOUT COMPLICATION: Primary | ICD-10-CM

## 2024-08-01 DIAGNOSIS — J45.31 MILD PERSISTENT ASTHMA WITH EXACERBATION: ICD-10-CM

## 2024-08-01 RX ORDER — PREDNISONE 20 MG/1
20 TABLET ORAL DAILY
Qty: 5 TABLET | Refills: 0 | Status: SHIPPED | OUTPATIENT
Start: 2024-08-01 | End: 2024-08-06

## 2024-08-01 RX ORDER — IPRATROPIUM BROMIDE 0.5 MG/2.5ML
500 SOLUTION RESPIRATORY (INHALATION) EVERY 6 HOURS PRN
Qty: 180 ML | Refills: 3 | Status: SHIPPED | OUTPATIENT
Start: 2024-08-01 | End: 2025-08-01

## 2024-08-01 NOTE — ASSESSMENT & PLAN NOTE
- continue Breo and albuterol as needed, feels that Breo is working better than Symbicort  - sent for atrovent to be able to add to nebulizer   - prescribed 5 days of prednisone for next time having flare  - is going to wear a mask when cleaning going forward  - had dogs and cats, discussed an air purifier  - eval with amarilys, walk, ige on follow up in person, recent labs reviewed and eos elevated  - will refer to PDM for education and spacer if pt does not currently have

## 2024-08-01 NOTE — PROGRESS NOTES
Subjective:      Patient ID: Sheryl Ortiz is a 58 y.o. female.    Chief Complaint: Asthma    The patient location is: [place]  The chief complaint leading to consultation is: Asthma  Visit type: audiovisual    Face to Face time with patient: 15:50 minutes  30 minutes of total time spent on the encounter, which includes face to face time and non-face to face time preparing to see the patient (eg, review of tests), Obtaining and/or reviewing separately obtained history, Documenting clinical information in the electronic or other health record, Independently interpreting results (not separately reported) and communicating results to the patient/family/caregiver, or Care coordination (not separately reported).     Each patient to whom he or she provides medical services by telemedicine is:  (1) informed of the relationship between the physician and patient and the respective role of any other health care provider with respect to management of the patient; and (2) notified that he or she may decline to receive medical services by telemedicine and may withdraw from such care at any time.    HPI 8/1/24:    58 year old female with history of gastric ulcer who presents to Pulmonary clinic for asthma hospital follow up. Chart reviewed- 3 ED visits in the past 5 months for asthma exacerbation, none requiring admission, the last being 7/24/24- treated with steroids and duo-nebs.     First told had asthma 2 years ago. 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 Most recent time, had flare after cleaning in her house- exposed to pollen and chemicals. Now trying to wear mask and avoiding bleach. Got bronchitis every year after Thanksgiving since she was a young child. No known hx autoimmune disease, MI, CVA, or cancer.     Pertinent Work Up:  - Eos 0.5 7/2024    Pulmonary Interventions:  - Albuterol HFA-as needed  - Albuterol neb- Using as needed, not necessarily everyday, used this morning  "and then 2 weeks prior  - Zyrtec- helps  - Breo- working better than Symbicort    Smoking hx: Quit 2022, started at 27, usually half pack/day average (binge smoked with alcohol, was an alcoholic), about 15 pack years  Environmental hx: none known, no pet birds, does have dogs and cats    Review of Systems   Respiratory:  Positive for cough, wheezing and dyspnea on extertion.      Objective:     Physical Exam   Constitutional:   Pt viewed via phone camera, she is awake and alert, in no distress, able to provide full and clear      Personal Diagnostic Review  As Above      7/24/2024    10:21 AM 7/24/2024     9:36 AM 7/24/2024     8:51 AM 7/24/2024     8:40 AM 7/24/2024     8:25 AM 7/24/2024     8:06 AM 4/5/2024     5:11 PM   Pulmonary Function Tests   SpO2 95 % 94 % 94 % 100 % 98 % 94 %  95 %   Height      5' 7" (1.702 m)    Weight      104 kg (229 lb 4.5 oz)    BMI (Calculated)      35.9        Significant value        Assessment:     No diagnosis found.     Outpatient Encounter Medications as of 8/1/2024   Medication Sig Dispense Refill    albuterol (PROVENTIL/VENTOLIN HFA) 90 mcg/actuation inhaler Inhale 1-2 puffs into the lungs every 6 (six) hours as needed for Wheezing or Shortness of Breath. Rescue 6.7 g 0    albuterol (PROVENTIL/VENTOLIN HFA) 90 mcg/actuation inhaler Inhale 2 puffs into the lungs every 4 (four) hours as needed for Wheezing. Rescue 8 g 0    albuterol sulfate 2.5 mg/0.5 mL Nebu Take 2.5 mg by nebulization every 4 (four) hours as needed (wheezing or shortness of breath). Rescue 10 each 0    azithromycin (Z-NASEEM) 250 MG tablet Take 1 tablet (250 mg total) by mouth once daily. Take first 2 tablets together, then 1 every day until finished. 6 tablet 0    pantoprazole (PROTONIX) 40 MG tablet TAKE ONE TABLET BY MOUTH ONCE DAILY 30 tablet 2    predniSONE (DELTASONE) 10 MG tablet Take 4 tabs x 3 days, then take 2 tabs x 3 days, then take 1 tab x 3 days. 21 tablet 0    SYMBICORT 160-4.5 mcg/actuation HFAA " Inhale 2 puffs into the lungs 2 (two) times daily.       No facility-administered encounter medications on file as of 8/1/2024.     No orders of the defined types were placed in this encounter.        Plan:     Problem List Items Addressed This Visit          Pulmonary    Mild persistent asthma without complication - Primary     - continue Breo and albuterol as needed, feels that Breo is working better than Symbicort  - sent for atrovent to be able to add to nebulizer   - prescribed 5 days of prednisone for next time having flare  - is going to wear a mask when cleaning going forward  - had dogs and cats, discussed an air purifier  - eval with amarilys, walk, ige on follow up in person, recent labs reviewed and eos elevated  - will refer to PDM for education and spacer if pt does not currently have         Relevant Orders    Ambulatory referral/consult to Pulmonary Disease Management w/ Respiratory Therapist       Other    Cigarette nicotine dependence in remission     - quit 2022, smoked when she drank, would not smoke for several days then possible go through 1-2 packs in a night, about 16 pack years  - no prior CT Chests  - PFT          Other Visit Diagnoses       Asthma exacerbation        Relevant Medications    predniSONE (DELTASONE) 20 MG tablet    ipratropium (ATROVENT) 0.02 % nebulizer solution    Other Relevant Orders    Complete PFT w/ bronchodilator    Stress test, pulmonary    IGE          Plan discussed with pt and she expressed understanding, all questions answered. RTC with testing. Thank you Dr. Bañuelos for referring Mrs. Ortiz for evaluation.

## 2024-08-01 NOTE — ASSESSMENT & PLAN NOTE
- quit 2022, smoked when she drank, would not smoke for several days then possible go through 1-2 packs in a night, about 16 pack years  - no prior CT Chests  - PFT

## 2024-10-01 ENCOUNTER — LAB VISIT (OUTPATIENT)
Dept: LAB | Facility: HOSPITAL | Age: 59
End: 2024-10-01
Payer: MEDICAID

## 2024-10-01 ENCOUNTER — CLINICAL SUPPORT (OUTPATIENT)
Dept: PULMONOLOGY | Facility: CLINIC | Age: 59
End: 2024-10-01
Payer: MEDICAID

## 2024-10-01 ENCOUNTER — TELEPHONE (OUTPATIENT)
Dept: SLEEP MEDICINE | Facility: CLINIC | Age: 59
End: 2024-10-01
Payer: MEDICAID

## 2024-10-01 ENCOUNTER — OFFICE VISIT (OUTPATIENT)
Dept: PULMONOLOGY | Facility: CLINIC | Age: 59
End: 2024-10-01
Payer: MEDICAID

## 2024-10-01 VITALS
HEART RATE: 75 BPM | HEIGHT: 67 IN | BODY MASS INDEX: 37.51 KG/M2 | HEIGHT: 67 IN | OXYGEN SATURATION: 98 % | WEIGHT: 239 LBS | RESPIRATION RATE: 21 BRPM | DIASTOLIC BLOOD PRESSURE: 73 MMHG | SYSTOLIC BLOOD PRESSURE: 137 MMHG | BODY MASS INDEX: 37.51 KG/M2 | WEIGHT: 239 LBS

## 2024-10-01 DIAGNOSIS — J45.31 MILD PERSISTENT ASTHMA WITH EXACERBATION: ICD-10-CM

## 2024-10-01 DIAGNOSIS — J45.30 MILD PERSISTENT ASTHMA WITHOUT COMPLICATION: ICD-10-CM

## 2024-10-01 DIAGNOSIS — F17.211 CIGARETTE NICOTINE DEPENDENCE IN REMISSION: ICD-10-CM

## 2024-10-01 DIAGNOSIS — G47.30 SLEEP-DISORDERED BREATHING: Primary | ICD-10-CM

## 2024-10-01 LAB — IGE SERPL-ACNC: <35 IU/ML (ref 0–100)

## 2024-10-01 PROCEDURE — 36415 COLL VENOUS BLD VENIPUNCTURE: CPT | Performed by: HOSPITALIST

## 2024-10-01 PROCEDURE — 94729 DIFFUSING CAPACITY: CPT | Mod: PBBFAC

## 2024-10-01 PROCEDURE — 82785 ASSAY OF IGE: CPT | Performed by: HOSPITALIST

## 2024-10-01 PROCEDURE — 99211 OFF/OP EST MAY X REQ PHY/QHP: CPT | Mod: PBBFAC,25

## 2024-10-01 PROCEDURE — 3075F SYST BP GE 130 - 139MM HG: CPT | Mod: CPTII,,, | Performed by: HOSPITALIST

## 2024-10-01 PROCEDURE — 3078F DIAST BP <80 MM HG: CPT | Mod: CPTII,,, | Performed by: HOSPITALIST

## 2024-10-01 PROCEDURE — 99999 PR PBB SHADOW E&M-EST. PATIENT-LVL I: CPT | Mod: PBBFAC,,,

## 2024-10-01 PROCEDURE — 99999 PR PBB SHADOW E&M-EST. PATIENT-LVL III: CPT | Mod: PBBFAC,,, | Performed by: HOSPITALIST

## 2024-10-01 PROCEDURE — 3008F BODY MASS INDEX DOCD: CPT | Mod: CPTII,,, | Performed by: HOSPITALIST

## 2024-10-01 PROCEDURE — 99213 OFFICE O/P EST LOW 20 MIN: CPT | Mod: PBBFAC,27,25 | Performed by: HOSPITALIST

## 2024-10-01 PROCEDURE — 1160F RVW MEDS BY RX/DR IN RCRD: CPT | Mod: CPTII,,, | Performed by: HOSPITALIST

## 2024-10-01 PROCEDURE — 99214 OFFICE O/P EST MOD 30 MIN: CPT | Mod: S$PBB,25,, | Performed by: HOSPITALIST

## 2024-10-01 PROCEDURE — 94726 PLETHYSMOGRAPHY LUNG VOLUMES: CPT | Mod: PBBFAC

## 2024-10-01 PROCEDURE — 94618 PULMONARY STRESS TESTING: CPT | Mod: PBBFAC

## 2024-10-01 PROCEDURE — 1159F MED LIST DOCD IN RCRD: CPT | Mod: CPTII,,, | Performed by: HOSPITALIST

## 2024-10-01 PROCEDURE — 94060 EVALUATION OF WHEEZING: CPT | Mod: PBBFAC

## 2024-10-01 RX ORDER — CETIRIZINE HYDROCHLORIDE 10 MG/1
10 TABLET ORAL NIGHTLY PRN
COMMUNITY
Start: 2024-07-17

## 2024-10-01 RX ORDER — FLUTICASONE FUROATE AND VILANTEROL 100; 25 UG/1; UG/1
POWDER RESPIRATORY (INHALATION)
COMMUNITY
Start: 2024-08-28

## 2024-10-01 RX ORDER — FERROUS SULFATE 325(65) MG
TABLET ORAL 2 TIMES DAILY
COMMUNITY
Start: 2024-08-28

## 2024-10-01 NOTE — ASSESSMENT & PLAN NOTE
- controlled over the past 2 months  - continue Breo, albuterol neb in the morning, duo-neb and albuterol hfa as needed during the day  - has prednisone and z-pack available as needed  - IGE pending today, discussed with pt- if elevated will go ahead and refer to Allergy, eos 500 in July  - discussed would refer to Allergy if frequent flares in the future

## 2024-10-01 NOTE — ASSESSMENT & PLAN NOTE
- heavily caffeinated in the mornings, otherwise would easily doze off during the day  - does wake up with a sore throat from snoring at times, sleeps in a separate bedroom  - ESS 15, stop-bang 5  - agreeable to 2 night HST

## 2024-10-01 NOTE — PROCEDURES
"[facility] [place]  Six Minute Walk     SUMMARY     Ordering Provider: Anna Bansal PA-C   Interpreting Provider: Jostin Watson MD  Performing nurse/tech/RT: VT, RT  Diagnosis:  (Mild persistent asthma with exacerbation)  Height: 5' 7" (170.2 cm)  Weight: 108.4 kg (238 lb 15.7 oz)  BMI (Calculated): 37.4                Phase Oxygen Assessment Supplemental O2 Heart   Rate Blood Pressure Salomon Dyspnea Scale Rating   Resting 97 % Room Air 62 bpm 152/72 0   Exercise        Minute        1 92 % Room Air 109 bpm     2 92 % Room Air 100 bpm     3 95 % Room Air 105 bpm     4 95 % Room Air 106 bpm     5 95 % Room Air 111 bpm     6  95 % Room Air 109 bpm 176/74 4   Recovery        Minute        1 97 % Room Air 83 bpm     2 97 % Room Air 71 bpm     3 95 % Room Air 71 bpm     4 96 % Room Air 72 bpm 137/73 1     Six Minute Walk Summary  6MWT Status: completed without stopping  Patient Reported: Dyspnea     Interpretation:  Did the patient stop or pause?: No                                         Total Time Walked (Calculated): 360 seconds  Final Partial Lap Distance (feet): 50 feet  Total Distance Meters (Calculated): 441.96 meters  Predicted Distance Meters (Calculated): 442.65 meters  Percentage of Predicted (Calculated): 99.84  Peak VO2 (Calculated): 17.24  Mets: 4.93  Has The Patient Had a Previous Six Minute Walk Test?: No       Previous 6MWT Results  Has The Patient Had a Previous Six Minute Walk Test?: No         CLINICAL INTERPRETATION:  Six minute walk distance is 442 mts (99 % predicted) with moderate dyspnea.  During exercise, there was no significant desaturation while breathing room air.  Hypertension was present prior to exercise.  This may represent normal CV response to exercise.  The patient did not report non-pulmonary symptoms during exercise.  Based upon age and body mass index, exercise capacity is normal.      [x] Mild exercise-induced hypoxemia described as an arterial oxygen " saturation of 93-95% (or 3-4% less than at rest)  []  Moderate exercise-induced hypoxemia as 89-93%  []  Severe exercise induced hypoxemia as < 89% O2 saturation.  Medicare Criteria for oxygen prescription comments:   []  When arterial oxygen saturation is at or below 88% during exercise (severe exercise induced hypoxemia) then the patient falls under Medicare Group 1 criteria for supplemental oxygen

## 2024-10-01 NOTE — PROGRESS NOTES
Subjective:      Patient ID: Sheryl Ortiz is a 58 y.o. female.    Chief Complaint: Asthma    Interval Hx 10/1/24:    Mrs. Ortiz presents today for follow up asthma. She was seen for the first and most recent time 8/2024 in virtual visit- she was continued on Breo and albuterol, further eval with amarilys, walk, IgE, referred to PDM.     Hasn't had to use as needed Prednisone since last seen, no UC or ED visits since last seen. Resp status has been doing well.     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    [place] Sleepiness Scale TOTAL = 15  (validated sleepiness questionnaire with a higher score indicating greater sleepiness; range 0-24)    STOP-Bang Questionnaire   [x] Snoring    [x]  Tired/Fatigued/Sleepy  [] Obstruction (apneas/choking)  [] Pressure (HTN)  [x] BMI >35  [x] Age >50  [x] Neck >40 cm  [] Gender male  STOP-Bang = 5 (low risk 0-2,high risk 3-8)    Interim Work Up:  - [place]- Resting sat 97%, range 92-97%, Salomon 0-4, no stops, 441m (99% pred)  - Walk- borderline obstruction without significant BD response, normal LVs, normal diffusion- difficulty with test, flow loops with obstruction, no prior for comparison  - IgE- pending    Pulmonary Interventions:  - Breo- daily  - Albuterol HFA- rarely having to use during the day  - Albuterol neb- in the morning  - Zyrtec  - no Home O2     HPI 8/1/24:     58 year old female with history of gastric ulcer who presents to Pulmonary clinic for asthma hospital follow up. Chart reviewed- 3 ED visits in the past 5 months for asthma exacerbation, none requiring admission, the last being 7/24/24- treated with steroids and duo-nebs.      First told had asthma 2 years ago. 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 Most recent  "time, had flare after cleaning in her house- exposed to pollen and chemicals. Now trying to wear mask and avoiding bleach. Got bronchitis every year after Thanksgiving since she was a young child. No known hx autoimmune disease, MI, CVA, or cancer.      Pertinent Work Up:  - Eos 0.5 7/2024     Pulmonary Interventions:  - Albuterol HFA-as needed  - Albuterol neb- Using as needed, not necessarily everyday, used this morning and then 2 weeks prior  - Zyrtec- helps  - Breo- working better than Symbicort     Smoking hx: Quit 2022, started at 27, usually half pack/day average (binge smoked with alcohol, was an alcoholic), about 15 pack years  Environmental hx: none known, no pet birds, does have dogs and cats    Review of Systems   Respiratory:  Positive for cough. Negative for sputum production, wheezing and dyspnea on extertion.      Objective:     Physical Exam   Constitutional: She is oriented to person, place, and time. She appears well-developed and well-nourished. She is obese.   Cardiovascular: Normal rate and regular rhythm.   Pulmonary/Chest: Normal expansion, effort normal and breath sounds normal.   Neurological: She is alert and oriented to person, place, and time.   Skin: Skin is warm and dry.     Personal Diagnostic Review  As Above      7/24/2024    10:21 AM 7/24/2024     9:36 AM 7/24/2024     8:51 AM 7/24/2024     8:40 AM 7/24/2024     8:25 AM 7/24/2024     8:06 AM 4/5/2024     5:11 PM   Pulmonary Function Tests   SpO2 95 % 94 % 94 % 100 % 98 % 94 %  95 %   Height      5' 7" (1.702 m)    Weight      104 kg (229 lb 4.5 oz)    BMI (Calculated)      35.9        Significant value        Assessment:     No diagnosis found.     Outpatient Encounter Medications as of 10/1/2024   Medication Sig Dispense Refill    albuterol (PROVENTIL/VENTOLIN HFA) 90 mcg/actuation inhaler Inhale 1-2 puffs into the lungs every 6 (six) hours as needed for Wheezing or Shortness of Breath. Rescue 6.7 g 0    albuterol " (PROVENTIL/VENTOLIN HFA) 90 mcg/actuation inhaler Inhale 2 puffs into the lungs every 4 (four) hours as needed for Wheezing. Rescue 8 g 0    albuterol sulfate 2.5 mg/0.5 mL Nebu Take 2.5 mg by nebulization every 4 (four) hours as needed (wheezing or shortness of breath). Rescue 10 each 0    azithromycin (Z-NASEEM) 250 MG tablet Take 1 tablet (250 mg total) by mouth once daily. Take first 2 tablets together, then 1 every day until finished. 6 tablet 0    ipratropium (ATROVENT) 0.02 % nebulizer solution Take 2.5 mLs (500 mcg total) by nebulization every 6 (six) hours as needed for Wheezing. Rescue 180 mL 3    pantoprazole (PROTONIX) 40 MG tablet TAKE ONE TABLET BY MOUTH ONCE DAILY 30 tablet 2    predniSONE (DELTASONE) 10 MG tablet Take 4 tabs x 3 days, then take 2 tabs x 3 days, then take 1 tab x 3 days. 21 tablet 0    SYMBICORT 160-4.5 mcg/actuation HFAA Inhale 2 puffs into the lungs 2 (two) times daily.       No facility-administered encounter medications on file as of 10/1/2024.     No orders of the defined types were placed in this encounter.      Plan:     Problem List Items Addressed This Visit          Pulmonary    Mild persistent asthma without complication     - controlled over the past 2 months  - continue Breo, albuterol neb in the morning, duo-neb and albuterol hfa as needed during the day  - has prednisone and z-pack available as needed  - IGE pending today, discussed with pt- if elevated will go ahead and refer to Allergy, eos 500 in July  - discussed would refer to Allergy if frequent flares in the future              Other    Cigarette nicotine dependence in remission    Sleep-disordered breathing - Primary     - heavily caffeinated in the mornings, otherwise would easily doze off during the day  - does wake up with a sore throat from snoring at times, sleeps in a separate bedroom  - ESS 15, stop-bang 5  - agreeable to 2 night HST         Relevant Orders    Home Sleep Study     Virtual follow up in 5  weeks for HST review.

## 2024-10-01 NOTE — TELEPHONE ENCOUNTER
----- Message from Marlyn Finn sent at 10/1/2024  2:18 PM CDT -----  [facility]

## 2024-10-03 LAB
BRPFT: NORMAL
DLCO ADJ PRE: 22.88 ML/(MIN*MMHG)
DLCO SINGLE BREATH LLN: 19.12
DLCO SINGLE BREATH PRE REF: 92.1 %
DLCO SINGLE BREATH REF: 24.86
DLCOC SBVA LLN: 3.23
DLCOC SBVA PRE REF: 123.5 %
DLCOC SBVA REF: 4.58
DLCOC SINGLE BREATH LLN: 19.12
DLCOC SINGLE BREATH PRE REF: 92.1 %
DLCOC SINGLE BREATH REF: 24.86
DLCOVA LLN: 3.23
DLCOVA PRE REF: 123.5 %
DLCOVA PRE: 5.65 ML/(MIN*MMHG*L)
DLCOVA REF: 4.58
DLVAADJ PRE: 5.65 ML/(MIN*MMHG*L)
ERV LLN: -16449.14
ERV PRE REF: 18.9 %
ERV REF: 0.86
FEF 25 75 CHG: 12 %
FEF 25 75 LLN: 1.67
FEF 25 75 POST REF: 43.1 %
FEF 25 75 PRE REF: 38.4 %
FEF 25 75 REF: 3.07
FET100 CHG: -31.2 %
FEV1 CHG: -5.9 %
FEV1 FVC CHG: 7.4 %
FEV1 FVC LLN: 67
FEV1 FVC POST REF: 90.4 %
FEV1 FVC PRE REF: 84.1 %
FEV1 FVC REF: 79
FEV1 LLN: 2.11
FEV1 POST REF: 64.9 %
FEV1 PRE REF: 69 %
FEV1 REF: 2.78
FRCPLETH LLN: 2.04
FRCPLETH PREREF: 73 %
FRCPLETH REF: 2.87
FVC CHG: -12.4 %
FVC LLN: 2.7
FVC POST REF: 71.2 %
FVC PRE REF: 81.4 %
FVC REF: 3.55
IVC PRE: 2.4 L
IVC SINGLE BREATH LLN: 2.7
IVC SINGLE BREATH PRE REF: 67.7 %
IVC SINGLE BREATH REF: 3.55
MVV LLN: 91
MVV PRE REF: 54.4 %
MVV REF: 107
PEF CHG: -13.7 %
PEF LLN: 4.99
PEF POST REF: 57.5 %
PEF PRE REF: 66.7 %
PEF REF: 6.86
POST FEF 25 75: 1.32 L/S
POST FET 100: 3.82 SEC
POST FEV1 FVC: 71.47 %
POST FEV1: 1.81 L
POST FVC: 2.53 L
POST PEF: 3.95 L/S
PRE DLCO: 22.88 ML/(MIN*MMHG)
PRE ERV: 0.16 L
PRE FEF 25 75: 1.18 L/S
PRE FET 100: 5.54 SEC
PRE FEV1 FVC: 66.53 %
PRE FEV1: 1.92 L
PRE FRC PL: 2.09 L
PRE FVC: 2.89 L
PRE MVV: 58 L/MIN
PRE PEF: 4.57 L/S
PRE RV: 1.72 L
PRE TLC: 4.69 L
RAW LLN: 3.06
RAW PRE REF: 376.8 %
RAW PRE: 11.53 CMH2O*S/L
RAW REF: 3.06
RV LLN: 1.43
RV PRE REF: 85.7 %
RV REF: 2
RVTLC LLN: 29
RVTLC PRE REF: 94.7 %
RVTLC PRE: 36.64 %
RVTLC REF: 39
TLC LLN: 4.44
TLC PRE REF: 86.4 %
TLC REF: 5.43
VA PRE: 4.05 L
VA SINGLE BREATH LLN: 5.28
VA SINGLE BREATH PRE REF: 76.7 %
VA SINGLE BREATH REF: 5.28
VC LLN: 2.7
VC PRE REF: 83.8 %
VC PRE: 2.97 L
VC REF: 3.55
VTGRAWPRE: 2.17 L

## 2024-12-02 ENCOUNTER — HOSPITAL ENCOUNTER (OUTPATIENT)
Dept: SLEEP MEDICINE | Facility: HOSPITAL | Age: 59
Discharge: HOME | End: 2024-12-02
Payer: MEDICAID

## 2024-12-02 DIAGNOSIS — G47.30 SLEEP-DISORDERED BREATHING: ICD-10-CM

## 2024-12-02 PROCEDURE — 95806 SLEEP STUDY UNATT&RESP EFFT: CPT | Performed by: INTERNAL MEDICINE

## 2024-12-02 NOTE — PROCEDURES
"PHYSICIAN INTERPRETATION AND COMMENTS: Clinically significant sleep disordered breathing is not identified. Clinically  significant sleep disordered breathing is not identified. Additional testing may be required including in-lab  polysomnography.  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  SLEEP STUDY FINDINGS: Patient underwent a 2 night Home Sleep Test and by behavioral criteria, slept for approximately  11.28 hours, with a sleep latency of 12 minutes and a sleep efficiency of 86.5%. Snoring occurs for 14.1% (30 dB) of the study,  7.6% is very loud. The mean pulse rate is 68.7 BPM, with frequent pulse rate variability (52 events with >= 6 BPM  increase/decrease per hour).  TREATMENT CONSIDERATIONS: Based on this study, treatment is not required at this time for obstructive sleep  apnea/hypopnea. Weight gain, alcohol consumption, and time spent sleeping supine can affect the severity of KELVIN. Future  testing should be considered as the patient's risk factors change. Avoid operating machinery when sleepy  DISEASE MANAGEMENT CONSIDERATIONS: None.      Dear Anna Bansal PA-C  [address] [place] [id number]/No, Primary Doctor         The sleep study that you ordered is complete.  You have ordered sleep LAB services to perform the sleep study for Sheryl Ortiz .      Please find Sleep Study result in  the "Media tab" of Chart Review menu.        You can look  for the report in the  Media by the document type "Sleep Study Documents". Alphabetizing  "Document type" column helps to find the SLEEP " "STUDY report  Faster.       As the ordering provider, you are responsible for reviewing the results and implementing a treatment plan with your patient.    If you need a Sleep Medicine provider to explain the sleep study findings and arrange treatment for the patient, please refer patient for consultation to our Sleep Clinic via [facility] with Ambulatory Consult Sleep.     To do that please place an order for an  "Ambulatory Consult Sleep" -  order , it will go to our clinic work queue for our staff  to contact the patient for an appointment.      For any questions, please contact our sleep lab  staff at [phone] to talk to clinical staff          David Astudillo MD   "

## 2025-01-24 DIAGNOSIS — J45.31 MILD PERSISTENT ASTHMA WITH EXACERBATION: ICD-10-CM

## 2025-01-27 RX ORDER — IPRATROPIUM BROMIDE 0.5 MG/2.5ML
SOLUTION RESPIRATORY (INHALATION)
Qty: 150 ML | Refills: 3 | Status: SHIPPED | OUTPATIENT
Start: 2025-01-27

## 2025-03-03 ENCOUNTER — TELEPHONE (OUTPATIENT)
Dept: PULMONOLOGY | Facility: CLINIC | Age: 60
End: 2025-03-03
Payer: MEDICAID

## 2025-07-15 ENCOUNTER — HOSPITAL ENCOUNTER (EMERGENCY)
Facility: HOSPITAL | Age: 60
Discharge: HOME | End: 2025-07-15
Attending: EMERGENCY MEDICINE
Payer: MEDICAID

## 2025-07-15 VITALS
RESPIRATION RATE: 16 BRPM | WEIGHT: 204.81 LBS | TEMPERATURE: 98 F | OXYGEN SATURATION: 97 % | SYSTOLIC BLOOD PRESSURE: 124 MMHG | DIASTOLIC BLOOD PRESSURE: 72 MMHG | BODY MASS INDEX: 32.15 KG/M2 | HEART RATE: 68 BPM | HEIGHT: 67 IN

## 2025-07-15 DIAGNOSIS — R07.9 CHEST PAIN: ICD-10-CM

## 2025-07-15 LAB
ABSOLUTE EOSINOPHIL (OHS): 0.58 K/UL
ABSOLUTE MONOCYTE (OHS): 0.7 K/UL (ref 0.3–1)
ABSOLUTE NEUTROPHIL COUNT (OHS): 7.75 K/UL (ref 1.8–7.7)
ALBUMIN SERPL BCP-MCNC: 3.9 G/DL (ref 3.5–5.2)
ALP SERPL-CCNC: 77 UNIT/L (ref 40–150)
ALT SERPL W/O P-5'-P-CCNC: 15 UNIT/L (ref 10–44)
ANION GAP (OHS): 9 MMOL/L (ref 8–16)
AST SERPL-CCNC: 16 UNIT/L (ref 11–45)
BASOPHILS # BLD AUTO: 0.08 K/UL
BASOPHILS NFR BLD AUTO: 0.8 %
BILIRUB SERPL-MCNC: 0.7 MG/DL (ref 0.1–1)
BNP SERPL-MCNC: 36 PG/ML (ref 0–99)
BUN SERPL-MCNC: 22 MG/DL (ref 6–20)
CALCIUM SERPL-MCNC: 9.4 MG/DL (ref 8.7–10.5)
CHLORIDE SERPL-SCNC: 109 MMOL/L (ref 95–110)
CO2 SERPL-SCNC: 23 MMOL/L (ref 23–29)
CREAT SERPL-MCNC: 0.8 MG/DL (ref 0.5–1.4)
ERYTHROCYTE [DISTWIDTH] IN BLOOD BY AUTOMATED COUNT: 13 % (ref 11.5–14.5)
GFR SERPLBLD CREATININE-BSD FMLA CKD-EPI: >60 ML/MIN/1.73/M2
GLUCOSE SERPL-MCNC: 120 MG/DL (ref 70–110)
HCT VFR BLD AUTO: 46.6 % (ref 37–48.5)
HGB BLD-MCNC: 15.2 GM/DL (ref 12–16)
IMM GRANULOCYTES # BLD AUTO: 0.02 K/UL (ref 0–0.04)
IMM GRANULOCYTES NFR BLD AUTO: 0.2 % (ref 0–0.5)
LYMPHOCYTES # BLD AUTO: 1.51 K/UL (ref 1–4.8)
MCH RBC QN AUTO: 29.3 PG (ref 27–31)
MCHC RBC AUTO-ENTMCNC: 32.6 G/DL (ref 32–36)
MCV RBC AUTO: 90 FL (ref 82–98)
NUCLEATED RBC (/100WBC) (OHS): 0 /100 WBC
OHS QRS DURATION: 90 MS
OHS QTC CALCULATION: 439 MS
PLATELET # BLD AUTO: 153 K/UL (ref 150–450)
PMV BLD AUTO: 10.7 FL (ref 9.2–12.9)
POTASSIUM SERPL-SCNC: 4.1 MMOL/L (ref 3.5–5.1)
PROT SERPL-MCNC: 7 GM/DL (ref 6–8.4)
RBC # BLD AUTO: 5.19 M/UL (ref 4–5.4)
RELATIVE EOSINOPHIL (OHS): 5.5 %
RELATIVE LYMPHOCYTE (OHS): 14.2 % (ref 18–48)
RELATIVE MONOCYTE (OHS): 6.6 % (ref 4–15)
RELATIVE NEUTROPHIL (OHS): 72.7 % (ref 38–73)
SODIUM SERPL-SCNC: 141 MMOL/L (ref 136–145)
TROPONIN I SERPL DL<=0.01 NG/ML-MCNC: <0.006 NG/ML
TROPONIN I SERPL DL<=0.01 NG/ML-MCNC: <0.006 NG/ML
WBC # BLD AUTO: 10.64 K/UL (ref 3.9–12.7)

## 2025-07-15 PROCEDURE — 85025 COMPLETE CBC W/AUTO DIFF WBC: CPT | Mod: ER | Performed by: EMERGENCY MEDICINE

## 2025-07-15 PROCEDURE — 94761 N-INVAS EAR/PLS OXIMETRY MLT: CPT | Mod: ER

## 2025-07-15 PROCEDURE — 93010 ELECTROCARDIOGRAM REPORT: CPT | Mod: ,,, | Performed by: INTERNAL MEDICINE

## 2025-07-15 PROCEDURE — 63600175 PHARM REV CODE 636 W HCPCS: Mod: JZ,TB,ER | Performed by: EMERGENCY MEDICINE

## 2025-07-15 PROCEDURE — 84484 ASSAY OF TROPONIN QUANT: CPT | Mod: ER | Performed by: EMERGENCY MEDICINE

## 2025-07-15 PROCEDURE — 99285 EMERGENCY DEPT VISIT HI MDM: CPT | Mod: 25,ER

## 2025-07-15 PROCEDURE — 25000003 PHARM REV CODE 250: Mod: ER | Performed by: EMERGENCY MEDICINE

## 2025-07-15 PROCEDURE — 96374 THER/PROPH/DIAG INJ IV PUSH: CPT | Mod: ER

## 2025-07-15 PROCEDURE — 93005 ELECTROCARDIOGRAM TRACING: CPT | Mod: ER

## 2025-07-15 PROCEDURE — 82040 ASSAY OF SERUM ALBUMIN: CPT | Mod: ER | Performed by: EMERGENCY MEDICINE

## 2025-07-15 PROCEDURE — 83880 ASSAY OF NATRIURETIC PEPTIDE: CPT | Mod: ER | Performed by: EMERGENCY MEDICINE

## 2025-07-15 RX ORDER — KETOROLAC TROMETHAMINE 30 MG/ML
15 INJECTION, SOLUTION INTRAMUSCULAR; INTRAVENOUS
Status: COMPLETED | OUTPATIENT
Start: 2025-07-15 | End: 2025-07-15

## 2025-07-15 RX ORDER — ASPIRIN 325 MG
325 TABLET ORAL
Status: COMPLETED | OUTPATIENT
Start: 2025-07-15 | End: 2025-07-15

## 2025-07-15 RX ORDER — TOPIRAMATE 25 MG/1
25 TABLET, FILM COATED ORAL 2 TIMES DAILY
COMMUNITY

## 2025-07-15 RX ORDER — LIDOCAINE HYDROCHLORIDE 20 MG/ML
15 SOLUTION OROPHARYNGEAL ONCE
Status: COMPLETED | OUTPATIENT
Start: 2025-07-15 | End: 2025-07-15

## 2025-07-15 RX ORDER — NITROGLYCERIN 20 MG/G
1 OINTMENT TOPICAL
Status: COMPLETED | OUTPATIENT
Start: 2025-07-15 | End: 2025-07-15

## 2025-07-15 RX ORDER — ACETAMINOPHEN 500 MG
1000 TABLET ORAL
Status: COMPLETED | OUTPATIENT
Start: 2025-07-15 | End: 2025-07-15

## 2025-07-15 RX ORDER — ALUMINUM HYDROXIDE, MAGNESIUM HYDROXIDE, AND SIMETHICONE 1200; 120; 1200 MG/30ML; MG/30ML; MG/30ML
30 SUSPENSION ORAL ONCE
Status: COMPLETED | OUTPATIENT
Start: 2025-07-15 | End: 2025-07-15

## 2025-07-15 RX ORDER — NAPROXEN SODIUM 220 MG/1
325 TABLET, FILM COATED ORAL
Status: DISCONTINUED | OUTPATIENT
Start: 2025-07-15 | End: 2025-07-15

## 2025-07-15 RX ORDER — PHENTERMINE HYDROCHLORIDE 37.5 MG/1
18.75 TABLET ORAL
COMMUNITY
Start: 2025-05-14 | End: 2025-08-12

## 2025-07-15 RX ADMIN — ASPIRIN 325 MG: 325 TABLET ORAL at 05:07

## 2025-07-15 RX ADMIN — ACETAMINOPHEN 1000 MG: 500 TABLET ORAL at 07:07

## 2025-07-15 RX ADMIN — LIDOCAINE HYDROCHLORIDE 15 ML: 20 SOLUTION ORAL at 06:07

## 2025-07-15 RX ADMIN — NITROGLYCERIN 1 INCH: 20 OINTMENT TOPICAL at 05:07

## 2025-07-15 RX ADMIN — ALUMINUM HYDROXIDE, MAGNESIUM HYDROXIDE, AND DIMETHICONE 30 ML: 200; 20; 200 SUSPENSION ORAL at 06:07

## 2025-07-15 RX ADMIN — KETOROLAC TROMETHAMINE 15 MG: 30 INJECTION, SOLUTION INTRAMUSCULAR at 06:07

## 2025-07-15 NOTE — ED PROVIDER NOTES
Encounter Date: 7/15/2025       History     Chief Complaint   Patient presents with    Chest Pain     Pt to ER for evaluation of chest pressure; pt reports chest pressure started around 11pm; pt reports waking this am with increased chest pressure, SOB and a headache     The history is provided by the patient.   Chest Pain  The current episode started yesterday. Chest pain occurs constantly. The chest pain is unchanged. At its most intense, the chest pain is at 5/10. The chest pain is currently at 5/10. The quality of the pain is described as pressure-like. The pain does not radiate. Pertinent negatives for primary symptoms include no fever, no shortness of breath, no cough, no wheezing, no palpitations, no abdominal pain, no nausea and no vomiting.   Pertinent negatives for associated symptoms include no weakness.     Review of patient's allergies indicates:  No Known Allergies  Past Medical History:   Diagnosis Date    Asthma     GERD (gastroesophageal reflux disease)      Past Surgical History:   Procedure Laterality Date    COLONOSCOPY N/A 02/23/2022    Procedure: COLONOSCOPY;  Surgeon: Pattie Quiroz MD;  Location: [facility];  Service: Endoscopy;  Laterality: N/A;    ESOPHAGOGASTRODUODENOSCOPY N/A 02/23/2022    Procedure: EGD (ESOPHAGOGASTRODUODENOSCOPY);  Surgeon: Pattie Quiroz MD;  Location: [facility];  Service: Endoscopy;  Laterality: N/A;    ESOPHAGOGASTRODUODENOSCOPY N/A 05/02/2022    Procedure: EGD (ESOPHAGOGASTRODUODENOSCOPY);  Surgeon: Trish Stauffer MD;  Location: [facility];  Service: Endoscopy;  Laterality: N/A;    TUBAL LIGATION       No family history on file.  Social History[1]  Review of Systems   Constitutional:  Negative for fever.   HENT:  Negative for sore throat.    Respiratory:  Negative for cough, shortness of breath and wheezing.    Cardiovascular:  Positive for chest pain. Negative for palpitations.   Gastrointestinal:  Negative for abdominal pain, nausea and  vomiting.   Genitourinary:  Negative for dysuria.   Musculoskeletal:  Negative for back pain.   Skin:  Negative for rash.   Neurological:  Negative for weakness.   Hematological:  Does not bruise/bleed easily.       Physical Exam     Initial Vitals [07/15/25 0412]   BP Pulse Resp Temp SpO2   137/83 80 20 98.4 °F (36.9 °C) 98 %      MAP       --         Physical Exam    Nursing note and vitals reviewed.  Constitutional: She appears well-developed and well-nourished. No distress.   HENT:   Head: Normocephalic and atraumatic. Mouth/Throat: Oropharynx is clear and moist.   Eyes: Conjunctivae and EOM are normal. Pupils are equal, round, and reactive to light.   Neck: Neck supple.   Normal range of motion.  Cardiovascular:  Normal rate, regular rhythm and normal heart sounds.           Pulmonary/Chest: Breath sounds normal. No respiratory distress.   Abdominal: Abdomen is soft. Bowel sounds are normal. She exhibits no distension. There is no abdominal tenderness.   Musculoskeletal:         General: Normal range of motion.      Cervical back: Normal range of motion and neck supple.     Neurological: She is alert and oriented to person, place, and time. She has normal strength.   Skin: Skin is warm and dry.   Psychiatric: She has a normal mood and affect. Thought content normal.         ED Course   Procedures  Labs Reviewed   COMPREHENSIVE METABOLIC PANEL - Abnormal       Result Value    Sodium 141      Potassium 4.1      Chloride 109      CO2 23      Glucose 120 (*)     BUN 22 (*)     Creatinine 0.8      Calcium 9.4      Protein Total 7.0      Albumin 3.9      Bilirubin Total 0.7      ALP 77      AST 16      ALT 15      Anion Gap 9      eGFR >60     CBC WITH DIFFERENTIAL - Abnormal    WBC 10.64      RBC 5.19      HGB 15.2      HCT 46.6      MCV 90      MCH 29.3      MCHC 32.6      RDW 13.0      Platelet Count 153      MPV 10.7      Nucleated RBC 0      Neut % 72.7      Lymph % 14.2 (*)     Mono % 6.6      Eos % 5.5       Basophil % 0.8      Imm Grans % 0.2      Neut # 7.75 (*)     Lymph # 1.51      Mono # 0.70      Eos # 0.58 (*)     Baso # 0.08      Imm Grans # 0.02     TROPONIN I - Normal    Troponin-I <0.006     TROPONIN I - Normal    Troponin-I <0.006     B-TYPE NATRIURETIC PEPTIDE - Normal    BNP 36     CBC W/ AUTO DIFFERENTIAL    Narrative:     The following orders were created for panel order CBC auto differential.  Procedure                               Abnormality         Status                     ---------                               -----------         ------                     CBC with Differential[[id number]]       Abnormal            Final result                 Please view results for these tests on the individual orders.     EKG Readings: (Independently Interpreted)   Rhythm: Normal Sinus Rhythm. Heart Rate: 83. Conduction: Normal. ST Segments: Normal ST Segments. T Waves: Normal. Axis: Normal. Clinical Impression: Normal Sinus Rhythm     ECG Results              EKG 12-lead (Final result)        Collection Time Result Time QRS Duration OHS QTC Calculation    07/15/25 04:05:46 07/15/25 14:34:00 90 439                     Final result by Interface, [facility] (07/15/25 14:34:04)                   Narrative:    Test Reason : R07.89,    Vent. Rate :  83 BPM     Atrial Rate :  83 BPM     P-R Int : 154 ms          QRS Dur :  90 ms      QT Int : 374 ms       P-R-T Axes :  72  61  44 degrees    QTcB Int : 439 ms    Normal sinus rhythm  Normal ECG  When compared with ECG of 24-Jul-2024 08:30,  T wave inversion no longer evident in Anterior leads  Confirmed by Alen Scherer ([id number]) on 7/15/2025 2:33:56 PM    Referred By: AAAREFERRAL SELF           Confirmed By: Alen Scherer                                  Imaging Results              X-Ray Chest AP Portable (Final result)  Result time 07/15/25 07:20:05      Final result by KATY Davies Sr., MD (07/15/25 07:20:05)                   Impression:      1. The  lungs are clear.  2. The size of the heart is prominent.  Some of this may be secondary to magnification.  .      Electronically signed by: Kevyn Davies MD  Date:    07/15/2025  Time:    07:20               Narrative:    EXAMINATION:  XR CHEST AP PORTABLE    CLINICAL HISTORY:  Chest Pain;    COMPARISON:  07/24/2024    FINDINGS:  The size of the heart is prominent.  The lungs are clear. There is no pneumothorax.  The costophrenic angles are sharp.                        Wet Read by Fransisco Reilly MD (07/15/25 04:48:30, [facility] Emergency Dept, Emergency Medicine)    NAF                                     Medications   nitroGLYCERIN 2% TD oint ointment 1 inch (1 inch Topical (Top) Given 7/15/25 0501)   aspirin tablet 325 mg (325 mg Oral Given 7/15/25 0501)   ketorolac injection 15 mg (15 mg Intravenous Given 7/15/25 0607)   aluminum-magnesium hydroxide-simethicone 200-200-20 mg/5 mL suspension 30 mL (30 mLs Oral Given 7/15/25 0606)     And   LIDOcaine viscous HCl 2% oral solution 15 mL (15 mLs Oral Given 7/15/25 0606)   acetaminophen tablet 1,000 mg (1,000 mg Oral Given 7/15/25 0734)     Medical Decision Making  DDx ACS, Costochondritis, Pneumothorax    Problems Addressed:  Chest pain: acute illness or injury    Amount and/or Complexity of Data Reviewed  Labs: ordered.  Radiology: ordered and independent interpretation performed.  ECG/medicine tests: ordered and independent interpretation performed. Decision-making details documented in ED Course.    Risk  OTC drugs.  Prescription drug management.               ED Course as of 07/15/25 1752   Tue Jul 15, 2025   0714 7:14 AM Reassessment: I reassessed the pt.  The pt is resting comfortably and is NAD.  Pt states their sx have improved. Discussed test results, shared treatment plan, specific conditions for return, and the need for f/u.  Answered their questions at this time.  Pt understands and agrees to the plan.  The pt has remained hemodynamically  stable through ED course and is stable for discharge.    [BA]   0714 Here with chest pain starting this AM. Noted to be moving large tires yesterday, thinks it could be just muscles. Workup here is benign. EKG NSR. Troponin negative x2. CXR benign. Unlikely ACS today. Aortic dissection unlikely as well. No respiratory symptoms today. No wheezing.  [BA]      ED Course User Index  [BA] Jorje Polk MD                               Clinical Impression:  Final diagnoses:  [R07.9] Chest pain          ED Disposition Condition    Discharge Stable          ED Prescriptions    None       Follow-up Information       Follow up With Specialties Details Why Contact Info    Maria E Polanco MD Family Medicine Schedule an appointment as soon as possible for a visit in 2 days For re-evaluation and further treatment [address] [facility] [address]  [phone]      [facility] Emergency Dept Emergency Medicine Go today If symptoms worsen, For re-evaluation and further treatment [address]  Emergency Department  [facility] [id number] [phone]                   [1]   Social History  Tobacco Use    Smoking status: Former     Types: Cigarettes    Smokeless tobacco: Never   Substance Use Topics    Alcohol use: Not Currently     Comment: Socially    Drug use: Not Currently     Types: Marijuana        Jorje Polk MD  07/15/25 8215

## 2025-07-22 ENCOUNTER — HOSPITAL ENCOUNTER (OUTPATIENT)
Dept: RADIOLOGY | Facility: HOSPITAL | Age: 60
Discharge: HOME | End: 2025-07-22
Attending: NURSE PRACTITIONER
Payer: MEDICAID

## 2025-07-22 ENCOUNTER — OFFICE VISIT (OUTPATIENT)
Dept: PRIMARY CARE CLINIC | Facility: CLINIC | Age: 60
End: 2025-07-22
Payer: MEDICAID

## 2025-07-22 ENCOUNTER — PATIENT OUTREACH (OUTPATIENT)
Dept: ADMINISTRATIVE | Facility: OTHER | Age: 60
End: 2025-07-22
Payer: MEDICAID

## 2025-07-22 ENCOUNTER — TELEPHONE (OUTPATIENT)
Dept: PHYSICAL MEDICINE AND REHAB | Facility: CLINIC | Age: 60
End: 2025-07-22
Payer: MEDICAID

## 2025-07-22 VITALS
OXYGEN SATURATION: 96 % | BODY MASS INDEX: 31.92 KG/M2 | HEART RATE: 75 BPM | TEMPERATURE: 99 F | HEIGHT: 67 IN | WEIGHT: 203.38 LBS | SYSTOLIC BLOOD PRESSURE: 108 MMHG | DIASTOLIC BLOOD PRESSURE: 64 MMHG

## 2025-07-22 DIAGNOSIS — Z13.6 ENCOUNTER FOR LIPID SCREENING FOR CARDIOVASCULAR DISEASE: ICD-10-CM

## 2025-07-22 DIAGNOSIS — Z13.1 SCREENING FOR DIABETES MELLITUS: ICD-10-CM

## 2025-07-22 DIAGNOSIS — Z00.00 GENERAL MEDICAL EXAM: ICD-10-CM

## 2025-07-22 DIAGNOSIS — J45.31 MILD PERSISTENT ASTHMA WITH EXACERBATION: Primary | ICD-10-CM

## 2025-07-22 DIAGNOSIS — Z11.59 ENCOUNTER FOR HEPATITIS C SCREENING TEST FOR LOW RISK PATIENT: ICD-10-CM

## 2025-07-22 DIAGNOSIS — M25.532 LEFT WRIST PAIN: ICD-10-CM

## 2025-07-22 DIAGNOSIS — Z13.220 ENCOUNTER FOR LIPID SCREENING FOR CARDIOVASCULAR DISEASE: ICD-10-CM

## 2025-07-22 DIAGNOSIS — Z11.4 SCREENING FOR HIV (HUMAN IMMUNODEFICIENCY VIRUS): ICD-10-CM

## 2025-07-22 PROCEDURE — 3044F HG A1C LEVEL LT 7.0%: CPT | Mod: CPTII,,, | Performed by: NURSE PRACTITIONER

## 2025-07-22 PROCEDURE — 99214 OFFICE O/P EST MOD 30 MIN: CPT | Mod: PBBFAC,25,PN | Performed by: NURSE PRACTITIONER

## 2025-07-22 PROCEDURE — 1159F MED LIST DOCD IN RCRD: CPT | Mod: CPTII,,, | Performed by: NURSE PRACTITIONER

## 2025-07-22 PROCEDURE — 3008F BODY MASS INDEX DOCD: CPT | Mod: CPTII,,, | Performed by: NURSE PRACTITIONER

## 2025-07-22 PROCEDURE — 99204 OFFICE O/P NEW MOD 45 MIN: CPT | Mod: S$PBB,,, | Performed by: NURSE PRACTITIONER

## 2025-07-22 PROCEDURE — 73110 X-RAY EXAM OF WRIST: CPT | Mod: 26,LT,, | Performed by: RADIOLOGY

## 2025-07-22 PROCEDURE — 3078F DIAST BP <80 MM HG: CPT | Mod: CPTII,,, | Performed by: NURSE PRACTITIONER

## 2025-07-22 PROCEDURE — 99999 PR PBB SHADOW E&M-EST. PATIENT-LVL IV: CPT | Mod: PBBFAC,,, | Performed by: NURSE PRACTITIONER

## 2025-07-22 PROCEDURE — 73110 X-RAY EXAM OF WRIST: CPT | Mod: TC,PN,LT

## 2025-07-22 PROCEDURE — 3074F SYST BP LT 130 MM HG: CPT | Mod: CPTII,,, | Performed by: NURSE PRACTITIONER

## 2025-07-22 RX ORDER — ALBUTEROL SULFATE 90 UG/1
2 INHALANT RESPIRATORY (INHALATION) EVERY 4 HOURS PRN
Qty: 8 G | Refills: 3 | Status: SHIPPED | OUTPATIENT
Start: 2025-07-22

## 2025-07-22 RX ORDER — TIZANIDINE 2 MG/1
2 TABLET ORAL 3 TIMES DAILY
COMMUNITY
Start: 2025-03-07

## 2025-07-22 RX ORDER — DICLOFENAC SODIUM 10 MG/G
2 GEL TOPICAL
COMMUNITY
Start: 2024-11-25

## 2025-07-22 NOTE — PROGRESS NOTES
CHW - Initial Contact    This Community Health Worker completed the Social Determinant of Health questionnaire with patient during clinic visit today.    Pt identified barriers of most importance are none at this time.   Referrals to community agencies completed with patient consent outside of [facility] include. No outside referrals at this time.  Referrals were put through [facility] - no  Support and Services: None  Other information discussed the patient needs help with. No other information was discussed.   Follow up required: No  No future outreach task assigned    Patient do not need any help at this time.

## 2025-07-29 ENCOUNTER — PATIENT MESSAGE (OUTPATIENT)
Dept: PRIMARY CARE CLINIC | Facility: CLINIC | Age: 60
End: 2025-07-29
Payer: MEDICAID

## 2025-07-30 NOTE — PROGRESS NOTES
Subjective:       Patient ID: Sheryl Ortiz is a 59 y.o. female.    Chief Complaint: Follow-up (Hospital follow up, for pulled muscle. New Patient. ) and Arm Pain (Pain in the left arm and hand. )      History of Present Illness:   Sheryl Ortiz 59 y.o. female presents today for ER follow up and to establish care. Patient reports pain to left arm and hand. Additionally, patient is wanting to establish care. Patient reports hx of GERD and Asthma. Patient denies any other problems or concerns at this time. Treatment options and alternatives were discussed with the patient. Patient provided opportunity to ask additional questions.  All questions were answered. Voices understanding and acceptance of this advice. Instructed to call back if any further questions or concerns.                Past Medical History:   Diagnosis Date    Asthma     GERD (gastroesophageal reflux disease)      No family history on file.  Social History[1]  Encounter Medications[2]    Review of Systems   Constitutional:  Negative for appetite change, chills and fever.   HENT:  Negative for ear pain, sinus pressure, sore throat and trouble swallowing.    Eyes:  Negative for visual disturbance.   Respiratory:  Negative for shortness of breath.    Cardiovascular:  Negative for chest pain.   Gastrointestinal:  Negative for abdominal pain, diarrhea, nausea and vomiting.   Endocrine: Negative for cold intolerance, polyphagia and polyuria.   Genitourinary:  Negative for decreased urine volume and dysuria.   Musculoskeletal:  Positive for arthralgias (left arm and hand pain). Negative for back pain.   Skin:  Negative for rash.   Allergic/Immunologic: Negative for environmental allergies and food allergies.   Neurological:  Negative for dizziness, tremors, weakness and numbness.   Hematological:  Does not bruise/bleed easily.   Psychiatric/Behavioral:  Negative for confusion and hallucinations. The patient is not nervous/anxious and is not hyperactive.    All  "other systems reviewed and are negative.      Objective:   Physical Exam  Constitutional:       Appearance: She is well-developed.   HENT:      Head: Normocephalic.      Right Ear: Tympanic membrane normal.      Left Ear: Tympanic membrane normal.      Nose: Nose normal.   Eyes:      Pupils: Pupils are equal, round, and reactive to light.   Cardiovascular:      Rate and Rhythm: Normal rate.      Heart sounds: Normal heart sounds.   Pulmonary:      Effort: Pulmonary effort is normal.      Breath sounds: Normal breath sounds.   Abdominal:      General: Bowel sounds are normal.      Palpations: Abdomen is soft.   Musculoskeletal:         General: Normal range of motion.   Skin:     General: Skin is warm and dry.   Neurological:      Mental Status: She is alert and oriented to person, place, and time.   Psychiatric:         Behavior: Behavior normal.           /64 (BP Location: Right arm, Patient Position: Sitting)   Pulse 75   Temp 98.6 °F (37 °C) (Oral)   Ht 5' 7" (1.702 m)   Wt 92.3 kg (203 lb 6.4 oz)   LMP  (LMP Unknown)   SpO2 96%   BMI 31.86 kg/m²   Physical Exam               Results for orders placed or performed during the hospital encounter of 07/15/25   EKG 12-lead    Collection Time: 07/15/25  4:05 AM   Result Value Ref Range    QRS Duration 90 ms    OHS QTC Calculation 439 ms   Comprehensive metabolic panel    Collection Time: 07/15/25  4:22 AM   Result Value Ref Range    Sodium 141 136 - 145 mmol/L    Potassium 4.1 3.5 - 5.1 mmol/L    Chloride 109 95 - 110 mmol/L    CO2 23 23 - 29 mmol/L    Glucose 120 (H) 70 - 110 mg/dL    BUN 22 (H) 6 - 20 mg/dL    Creatinine 0.8 0.5 - 1.4 mg/dL    Calcium 9.4 8.7 - 10.5 mg/dL    Protein Total 7.0 6.0 - 8.4 gm/dL    Albumin 3.9 3.5 - 5.2 g/dL    Bilirubin Total 0.7 0.1 - 1.0 mg/dL    ALP 77 40 - 150 unit/L    AST 16 11 - 45 unit/L    ALT 15 10 - 44 unit/L    Anion Gap 9 8 - 16 mmol/L    eGFR >60 >60 mL/min/1.73/m2   Troponin I #1    Collection Time: 07/15/25 "  4:22 AM   Result Value Ref Range    Troponin-I <0.006 <=0.026 ng/mL   BNP    Collection Time: 07/15/25  4:22 AM   Result Value Ref Range    BNP 36 0 - 99 pg/mL   CBC with Differential    Collection Time: 07/15/25  4:22 AM   Result Value Ref Range    WBC 10.64 3.90 - 12.70 K/uL    RBC 5.19 4.00 - 5.40 M/uL    HGB 15.2 12.0 - 16.0 gm/dL    HCT 46.6 37.0 - 48.5 %    MCV 90 82 - 98 fL    MCH 29.3 27.0 - 31.0 pg    MCHC 32.6 32.0 - 36.0 g/dL    RDW 13.0 11.5 - 14.5 %    Platelet Count 153 150 - 450 K/uL    MPV 10.7 9.2 - 12.9 fL    Nucleated RBC 0 <=0 /100 WBC    Neut % 72.7 38 - 73 %    Lymph % 14.2 (L) 18 - 48 %    Mono % 6.6 4 - 15 %    Eos % 5.5 <=8 %    Basophil % 0.8 <=1.9 %    Imm Grans % 0.2 0.0 - 0.5 %    Neut # 7.75 (H) 1.8 - 7.7 K/uL    Lymph # 1.51 1 - 4.8 K/uL    Mono # 0.70 0.3 - 1 K/uL    Eos # 0.58 (H) <=0.5 K/uL    Baso # 0.08 <=0.2 K/uL    Imm Grans # 0.02 0.00 - 0.04 K/uL   Troponin I #2    Collection Time: 07/15/25  6:38 AM   Result Value Ref Range    Troponin-I <0.006 <=0.026 ng/mL     Assessment:       1. Mild persistent asthma with exacerbation    2. Left wrist pain    3. General medical exam    4. Screening for diabetes mellitus    5. Encounter for lipid screening for cardiovascular disease    6. Screening for HIV (human immunodeficiency virus)    7. Encounter for hepatitis C screening test for low risk patient    Assessment & Plan             Plan:   Mild persistent asthma with exacerbation    Left wrist pain  -     EMG W/ ULTRASOUND AND NERVE CONDUCTION TEST 2 Extremities; Future  -     Cancel: X-Ray Wrist 2 View Left; Future; Expected date: 07/22/2025    General medical exam  -     CBC Auto Differential; Future; Expected date: 07/22/2025  -     Comprehensive Metabolic Panel; Future; Expected date: 07/22/2025  -     Hemoglobin A1C; Future; Expected date: 07/22/2025  -     TSH; Future; Expected date: 07/22/2025  -     T3, Free; Future; Expected date: 07/22/2025  -     T4, Free; Future; Expected  date: 07/22/2025    Screening for diabetes mellitus    Encounter for lipid screening for cardiovascular disease  -     Lipid Panel; Future; Expected date: 07/22/2025    Screening for HIV (human immunodeficiency virus)  -     HIV 1/2 Ag/Ab (4th Gen); Future; Expected date: 07/22/2025    Encounter for hepatitis C screening test for low risk patient  -     Hepatitis C Antibody; Future; Expected date: 07/22/2025    Other orders  -     albuterol (PROVENTIL/VENTOLIN HFA) 90 mcg/actuation inhaler; Inhale 2 puffs into the lungs every 4 (four) hours as needed for Wheezing. Rescue  Dispense: 8 g; Refill: 3      Today's encounter took a total time of 20 minutes, and that time included Preparing to see the patient (review records, tests), Obtaining and/or reviewing separately obtained historical data, Performing a medically appropriate examination and/or evaluation , Counseling & educating the patient/family/caregiver on treatment plan with chronic health conditions , ordering medications, tests, and/or procedures, Referring and communicating with other healthcare professionals , Documenting clinical information in the electronic or other health record, Independently interpreting results & communicating results to the patient/family/caregiver.           [facility]   [address] [place] [address] [address]  [place] [address]  Office [phone]  Fax [phone]   This note was generated with the assistance of ambient listening technology. Verbal consent was obtained by the patient and accompanying visitor(s) for the recording of patient appointment to facilitate this note. I attest to having reviewed and edited the generated note for accuracy, though some syntax or spelling errors may persist. Please contact the author of this note for any clarification.   History of Present Illness                   [1]   Social History  Socioeconomic History    Marital status:    Tobacco Use    Smoking  status: Former     Types: Cigarettes    Smokeless tobacco: Never   Substance and Sexual Activity    Alcohol use: Not Currently     Comment: Socially    Drug use: Not Currently     Types: Marijuana    Sexual activity: Yes     Partners: Male     Social Drivers of Health     Financial Resource Strain: Medium Risk (7/16/2025)    Overall Financial Resource Strain (CARDIA)     Difficulty of Paying Living Expenses: Somewhat hard   Food Insecurity: Food Insecurity Present (7/16/2025)    Hunger Vital Sign     Worried About Running Out of Food in the Last Year: Sometimes true     Ran Out of Food in the Last Year: Sometimes true   Transportation Needs: Unmet Transportation Needs (7/16/2025)    PRAPARE - Transportation     Lack of Transportation (Medical): Yes     Lack of Transportation (Non-Medical): Yes   Physical Activity: Sufficiently Active (7/16/2025)    Exercise Vital Sign     Days of Exercise per Week: 7 days     Minutes of Exercise per Session: 90 min   Stress: No Stress Concern Present (7/16/2025)    [employer] - Occupational Stress Questionnaire     Feeling of Stress : Only a little   Housing Stability: High Risk (7/16/2025)    Housing Stability Vital Sign     Unable to Pay for Housing in the Last Year: Yes     Homeless in the Last Year: No   [2]   Outpatient Encounter Medications as of 7/22/2025   Medication Sig Dispense Refill    cetirizine (ZYRTEC) 10 MG tablet Take 10 mg by mouth nightly as needed.      diclofenac sodium (VOLTAREN) 1 % Gel Apply 2 g topically.      ferrous sulfate (FEOSOL) 325 mg (65 mg iron) Tab tablet Take by mouth 2 (two) times daily.      ipratropium (ATROVENT) 0.02 % nebulizer solution take 2 AND 1/2 mls (500 MICROGRAM total) by nebulization every 6 (six) hours as needed for wheezing. rescue 150 mL 3    mometasone-formoterol (DULERA) 100-5 mcg/actuation HFAA Inhale 2 puffs twice a day by inhalation route.      pantoprazole (PROTONIX) 40 MG tablet TAKE ONE TABLET  BY MOUTH ONCE DAILY 30 tablet 2    phentermine (ADIPEX-P) 37.5 mg tablet Take 18.75 mg by mouth.      tiZANidine (ZANAFLEX) 2 MG tablet Take 2 mg by mouth 3 (three) times daily.      topiramate (TOPAMAX) 25 MG tablet Take 25 mg by mouth 2 (two) times daily.      [DISCONTINUED] albuterol (PROVENTIL/VENTOLIN HFA) 90 mcg/actuation inhaler Inhale 2 puffs into the lungs every 4 (four) hours as needed for Wheezing. Rescue 8 g 0    albuterol (PROVENTIL/VENTOLIN HFA) 90 mcg/actuation inhaler Inhale 2 puffs into the lungs every 4 (four) hours as needed for Wheezing. Rescue 8 g 3    fluticasone furoate-vilanteroL (BREO) 100-25 mcg/dose diskus inhaler Inhale into the lungs. (Patient not taking: Reported on 7/22/2025)       No facility-administered encounter medications on file as of 7/22/2025.

## 2025-07-31 ENCOUNTER — PATIENT MESSAGE (OUTPATIENT)
Dept: PRIMARY CARE CLINIC | Facility: CLINIC | Age: 60
End: 2025-07-31
Payer: MEDICAID

## 2025-07-31 ENCOUNTER — OFFICE VISIT (OUTPATIENT)
Dept: PHYSICAL MEDICINE AND REHAB | Facility: CLINIC | Age: 60
End: 2025-07-31
Payer: MEDICAID

## 2025-07-31 VITALS — HEIGHT: 67 IN | BODY MASS INDEX: 31.94 KG/M2 | WEIGHT: 203.5 LBS | RESPIRATION RATE: 13 BRPM

## 2025-07-31 DIAGNOSIS — G56.03 BILATERAL CARPAL TUNNEL SYNDROME: ICD-10-CM

## 2025-07-31 PROCEDURE — 95910 NRV CNDJ TEST 7-8 STUDIES: CPT | Mod: PBBFAC | Performed by: PHYSICAL MEDICINE & REHABILITATION

## 2025-07-31 PROCEDURE — 99213 OFFICE O/P EST LOW 20 MIN: CPT | Mod: PBBFAC | Performed by: PHYSICAL MEDICINE & REHABILITATION

## 2025-07-31 PROCEDURE — 99999 PR PBB SHADOW E&M-EST. PATIENT-LVL III: CPT | Mod: PBBFAC,,, | Performed by: PHYSICAL MEDICINE & REHABILITATION

## 2025-07-31 PROCEDURE — 99499 UNLISTED E&M SERVICE: CPT | Mod: S$PBB,,, | Performed by: PHYSICAL MEDICINE & REHABILITATION

## 2025-07-31 PROCEDURE — 95910 NRV CNDJ TEST 7-8 STUDIES: CPT | Mod: 26,S$PBB,, | Performed by: PHYSICAL MEDICINE & REHABILITATION

## 2025-07-31 NOTE — PROGRESS NOTES
[facility]  Department of Physiatry-EMG  [facility]   [address] [place] [place] [address]       Full Name: Sheryl Ortiz YOB: 1965  Patient ID: [id number]      Visit Date: 7/31/2025 11:20 AM  Age: 59 Years  Examining Physician: Mary Richards M.D.  Referring Physician: GENOVEVA Smith  Patient History: upper extr      Chief Complaint   Patient presents with    Hand Pain       HPI: This is a 59 y.o.  female being seen in clinic today for evaluation of chronic left wrist/hand achy pain and numbness into her forearm.  She has symptoms at rest and with hand usage.  She is limited in ROM and strength. Rest only provides minimal relief.    History obtained from patient    Past family, medical, social, and surgical history reviewed in chart    Review of Systems:     General- denies lethargy, weight change, fever, chills  Head/neck- denies swallowing difficulties  ENT- denies hearing changes  Cardiovascular-denies chest pain  Pulmonary- denies shortness of breath  GI- denies constipation or bowel incontinence  - denies bladder incontinence  Skin- denies wounds or rashes  Musculoskeletal- + weakness, + pain  Neurologic- + numbness and tingling  Psychiatric- denies depressive or psychotic features, denies anxiety  Lymphatic-denies swelling  Endocrine- denies hypoglycemic symptoms/DM history  All other pertinent systems negative     Physical Examination:  General: Well developed, well nourished female, NAD  HEENT:NCAT EOMI bilaterally   Pulmonary:Normal respirations    Spinal Examination: CERVICAL  Active ROM is within normal limits.  Inspection: No deformity of spinal alignment.    Musculoskeletal Tests:  Phalen:   Elbow compression (ulnar): neg  Tinels at wrist: + on left    Bilateral Upper and Lower Extremities:  Pulses are 2+ at radial bilaterally.  Shoulder/Elbow/Wrist/Hand ROM wnl  Hip/Knee/Ankle ROM   Bilateral Extremities show normal capillary refill.  No  signs of cyanosis, rubor, edema, skin changes, or dysvascular changes of appendages.  Nails appear intact.    Neurological Exam:  Cranial Nerves:  II-XII grossly intact    Manual Muscle Testing: (Motor 5=normal)  5/5 strength bilateral upper/lower extremities    No focal atrophy is noted of either upper or lower extremity.    Bilateral Reflexes:  Keith's response is absent bilaterally.  Sensation: tested to light touch  - intact in arms    Gait: Narrow base and good arm swing.      Entire procedure explained to patient prior to proceeding.  Verbal consent obtained      Sensory NCS      Nerve / Sites Rec. Site Onset Lat Peak Lat Amp Distance Onset Dif Peak Diff Onset James     ms ms µV mm ms ms m/s   L Median - Dig II (Antidromic)      Wrist Index 3.4 4.2 12.8 140 3.4 4.2 40.7   L Ulnar - Dig V (Antidromic)      Wrist Dig V 2.6 3.3 18.0 140 2.6 3.3 54.9   L Radial - Superficial (Antidromic)      Forearm Wrist 1.4 2.0 10.8 100 1.4 2.0 73.8   R Median - Dig II (Antidromic)      Wrist Index 3.2 4.1 15.6 140 3.2 4.1 43.4       Motor NCS      Nerve / Sites Muscle Latency Amplitude Segments Dist. Lat Diff Velocity     ms mV  mm ms m/s   L Median - APB      Wrist APB 4.52 7.7 Wrist - APB 80        Elbow APB 9.81 4.1 Elbow - Wrist 230 5.29 43.5   L Ulnar - ADM      Wrist ADM 3.08 5.9 Wrist - ADM 80        B.Elbow ADM 6.75 5.9 B.Elbow - Wrist 230 3.67 62.7      A.Elbow ADM 8.54 5.2 A.Elbow - B.Elbow 110 1.79 61.4   R Median - APB      Wrist APB 4.44 7.0 Wrist - APB 80        Elbow APB 9.29 0.0 Elbow - Wrist 240 4.85 49.4                                      INTERPRETATION *pt did not tolerate full NCS testing  -Bilateral median motor nerve conduction study showed prolonged latency on the left, normal amplitude, and dec conduction velocity  -Bilateral median sensory nerve conduction study showed prolonged peak latency and normal amplitude  -Left ulnar motor nerve conduction study showed normal latency, amplitude, and  conduction velocity  -Left ulnar sensory nerve conduction study showed normal peak latency and amplitude  -Left radial sensory nerve conduction study showed normal peak latency and amplitude      IMPRESSION  ABNORMAL Study  2. 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 Follow up with referring provider: Fartun Bell*  2. Handouts on CTS provided. Rec referral to ortho hand.  3. This study is good for one year. If symptoms worsen or do not improve, please re-consult.    Mary Richards M.D.  Physical Medicine and Rehab

## 2025-08-04 ENCOUNTER — OFFICE VISIT (OUTPATIENT)
Dept: PRIMARY CARE CLINIC | Facility: CLINIC | Age: 60
End: 2025-08-04
Payer: MEDICAID

## 2025-08-04 DIAGNOSIS — G56.03 BILATERAL CARPAL TUNNEL SYNDROME: Primary | ICD-10-CM

## 2025-08-04 PROCEDURE — 1160F RVW MEDS BY RX/DR IN RCRD: CPT | Mod: CPTII,95,, | Performed by: NURSE PRACTITIONER

## 2025-08-04 PROCEDURE — 1159F MED LIST DOCD IN RCRD: CPT | Mod: CPTII,95,, | Performed by: NURSE PRACTITIONER

## 2025-08-04 PROCEDURE — 98004 SYNCH AUDIO-VIDEO EST SF 10: CPT | Mod: 95,,, | Performed by: NURSE PRACTITIONER

## 2025-08-04 PROCEDURE — 3044F HG A1C LEVEL LT 7.0%: CPT | Mod: CPTII,95,, | Performed by: NURSE PRACTITIONER

## 2025-08-04 NOTE — PROGRESS NOTES
Subjective:       Patient ID: Sheryl Ortiz is a 59 y.o. female.    The patient location is: [place], [place]    Visit type: audiovisual-Synchronous      Face to Face time with patient: 11 min  20  minutes of total time spent on the encounter, which includes face to face time and non-face to face time preparing to see the patient (eg, review of tests), Obtaining and/or reviewing separately obtained history, Documenting clinical information in the electronic or other health record, Independently interpreting results (not separately reported) and communicating results to the patient/family/caregiver, or Care coordination (not separately reported).         Each patient to whom he or she provides medical services by telemedicine is:  (1) informed of the relationship between the physician and patient and the respective role of any other health care provider with respect to management of the patient; and (2) notified that he or she may decline to receive medical services by telemedicine and may withdraw from such care at any time.       History of Present Illness:   Sheryl Ortiz 59 y.o. female presents today for results for EMG with nerve conduction which shows bilateral carpal syndrome. Treatment options and alternatives were discussed with the patient. Patient provided opportunity to ask additional questions.  All questions were answered. Voices understanding and acceptance of this advice. Instructed to call back if any further questions or concerns.   Past Medical History:   Diagnosis Date    Asthma     GERD (gastroesophageal reflux disease)      No family history on file.  Social History[1]  Encounter Medications[2]    Review of Systems   Constitutional:  Negative for activity change and unexpected weight change.   HENT:  Negative for hearing loss, rhinorrhea and trouble swallowing.    Eyes:  Negative for discharge and visual disturbance.   Respiratory:  Negative for chest tightness and wheezing.    Cardiovascular:   Negative for chest pain and palpitations.   Gastrointestinal:  Negative for blood in stool, constipation, diarrhea and vomiting.   Endocrine: Negative for polydipsia and polyuria.   Genitourinary:  Negative for difficulty urinating, dysuria, hematuria and menstrual problem.   Musculoskeletal:  Positive for arthralgias (bilateral wrist pain) and joint swelling. Negative for neck pain.   Neurological:  Negative for weakness and headaches.   Psychiatric/Behavioral:  Negative for confusion and dysphoric mood.        Objective:      LMP  (LMP Unknown)   Physical Exam  Constitutional:       Appearance: Normal appearance.   Neurological:      Mental Status: She is alert.        Physical Exam               Results for orders placed or performed in visit on 07/22/25   Comprehensive Metabolic Panel    Collection Time: 07/22/25  2:46 PM   Result Value Ref Range    Sodium 141 136 - 145 mmol/L    Potassium 4.0 3.5 - 5.1 mmol/L    Chloride 108 95 - 110 mmol/L    CO2 25 23 - 29 mmol/L    Glucose 90 70 - 110 mg/dL    BUN 18 6 - 20 mg/dL    Creatinine 0.8 0.5 - 1.4 mg/dL    Calcium 9.6 8.7 - 10.5 mg/dL    Protein Total 7.4 6.0 - 8.4 gm/dL    Albumin 4.3 3.5 - 5.2 g/dL    Bilirubin Total 0.5 0.1 - 1.0 mg/dL    ALP 88 40 - 150 unit/L    AST 18 11 - 45 unit/L    ALT 16 10 - 44 unit/L    Anion Gap 8 8 - 16 mmol/L    eGFR >60 >60 mL/min/1.73/m2   Lipid Panel    Collection Time: 07/22/25  2:46 PM   Result Value Ref Range    Cholesterol Total 185 120 - 199 mg/dL    Triglyceride 74 30 - 150 mg/dL    HDL Cholesterol 66 40 - 75 mg/dL    LDL Cholesterol 104.2 63.0 - 159.0 mg/dL    HDL/Cholesterol Ratio 35.7 20.0 - 50.0 %    Cholesterol/HDL Ratio 2.8 2.0 - 5.0    Non HDL Cholesterol 119 mg/dL   Hemoglobin A1C    Collection Time: 07/22/25  2:46 PM   Result Value Ref Range    Hemoglobin A1c 5.3 4.0 - 5.6 %    Estimated Average Glucose 105 68 - 131 mg/dL   TSH    Collection Time: 07/22/25  2:46 PM   Result Value Ref Range    TSH 1.033 0.400 -  4.000 uIU/mL   T3, Free    Collection Time: 07/22/25  2:46 PM   Result Value Ref Range    T3 Free 2.80 2.30 - 4.20 pg/mL   T4, Free    Collection Time: 07/22/25  2:46 PM   Result Value Ref Range    Free T4 1.05 0.71 - 1.51 ng/dL   HIV 1/2 Ag/Ab (4th Gen)    Collection Time: 07/22/25  2:46 PM   Result Value Ref Range    HIV 1/2 Ag/Ab Non-Reactive Non-Reactive   Hepatitis C Antibody    Collection Time: 07/22/25  2:46 PM   Result Value Ref Range    Hep C Ab Interp Non-Reactive Non-Reactive   CBC with Differential    Collection Time: 07/22/25  2:46 PM   Result Value Ref Range    WBC 7.91 3.90 - 12.70 K/uL    RBC 5.20 4.00 - 5.40 M/uL    HGB 15.3 12.0 - 16.0 gm/dL    HCT 47.7 37.0 - 48.5 %    MCV 92 82 - 98 fL    MCH 29.4 27.0 - 31.0 pg    MCHC 32.1 32.0 - 36.0 g/dL    RDW 13.0 11.5 - 14.5 %    Platelet Count 208 150 - 450 K/uL    MPV 10.8 9.2 - 12.9 fL    Nucleated RBC 0 <=0 /100 WBC    Neut % 57.5 38 - 73 %    Lymph % 27.3 18 - 48 %    Mono % 6.7 4 - 15 %    Eos % 6.6 <=8 %    Basophil % 1.5 <=1.9 %    Imm Grans % 0.4 0.0 - 0.5 %    Neut # 4.55 1.8 - 7.7 K/uL    Lymph # 2.16 1 - 4.8 K/uL    Mono # 0.53 0.3 - 1 K/uL    Eos # 0.52 (H) <=0.5 K/uL    Baso # 0.12 <=0.2 K/uL    Imm Grans # 0.03 0.00 - 0.04 K/uL     Assessment:       1. Bilateral carpal tunnel syndrome    Assessment & Plan             Plan:   Bilateral carpal tunnel syndrome  -     Ambulatory referral/consult to Orthopedics; Future; Expected date: 08/11/2025      Today's encounter took a total time of 20 minutes, and that time included Preparing to see the patient (review records, tests), Obtaining and/or reviewing separately obtained historical data, Performing a medically appropriate examination and/or evaluation , Counseling & educating the patient/family/caregiver on treatment plan with chronic health conditions , ordering medications, tests, and/or procedures, Referring and communicating with other healthcare professionals , Documenting clinical  information in the electronic or other health record, Independently interpreting results & communicating results to the patient/family/caregiver.           [facility]   [address]  [place] [address]  Office [phone]  Fax [phone]   This note was generated with the assistance of ambient listening technology. Verbal consent was obtained by the patient and accompanying visitor(s) for the recording of patient appointment to facilitate this note. I attest to having reviewed and edited the generated note for accuracy, though some syntax or spelling errors may persist. Please contact the author of this note for any clarification.                  [1]   Social History  Socioeconomic History    Marital status:    Tobacco Use    Smoking status: Former     Types: Cigarettes    Smokeless tobacco: Never   Substance and Sexual Activity    Alcohol use: Not Currently     Comment: Socially    Drug use: Not Currently     Types: Marijuana    Sexual activity: Yes     Partners: Male     Social Drivers of Health     Financial Resource Strain: Medium Risk (7/16/2025)    Overall Financial Resource Strain (CARDIA)     Difficulty of Paying Living Expenses: Somewhat hard   Food Insecurity: Food Insecurity Present (7/16/2025)    Hunger Vital Sign     Worried About Running Out of Food in the Last Year: Sometimes true     Ran Out of Food in the Last Year: Sometimes true   Transportation Needs: Unmet Transportation Needs (7/16/2025)    PRAPARE - Transportation     Lack of Transportation (Medical): Yes     Lack of Transportation (Non-Medical): Yes   Physical Activity: Sufficiently Active (7/16/2025)    Exercise Vital Sign     Days of Exercise per Week: 7 days     Minutes of Exercise per Session: 90 min   Stress: No Stress Concern Present (7/16/2025)    [employer] - Occupational Stress Questionnaire     Feeling of Stress : Only a little   Housing  Stability: High Risk (7/16/2025)    Housing Stability Vital Sign     Unable to Pay for Housing in the Last Year: Yes     Homeless in the Last Year: No   [2]   Outpatient Encounter Medications as of 8/4/2025   Medication Sig Dispense Refill    albuterol (PROVENTIL/VENTOLIN HFA) 90 mcg/actuation inhaler Inhale 2 puffs into the lungs every 4 (four) hours as needed for Wheezing. Rescue 8 g 3    cetirizine (ZYRTEC) 10 MG tablet Take 10 mg by mouth nightly as needed.      diclofenac sodium (VOLTAREN) 1 % Gel Apply 2 g topically.      ferrous sulfate (FEOSOL) 325 mg (65 mg iron) Tab tablet Take by mouth 2 (two) times daily.      fluticasone furoate-vilanteroL (BREO) 100-25 mcg/dose diskus inhaler Inhale into the lungs. (Patient not taking: Reported on 7/22/2025)      ipratropium (ATROVENT) 0.02 % nebulizer solution take 2 AND 1/2 mls (500 MICROGRAM total) by nebulization every 6 (six) hours as needed for wheezing. rescue 150 mL 3    mometasone-formoterol (DULERA) 100-5 mcg/actuation HFAA Inhale 2 puffs twice a day by inhalation route.      pantoprazole (PROTONIX) 40 MG tablet TAKE ONE TABLET BY MOUTH ONCE DAILY 30 tablet 2    phentermine (ADIPEX-P) 37.5 mg tablet Take 18.75 mg by mouth.      tiZANidine (ZANAFLEX) 2 MG tablet Take 2 mg by mouth 3 (three) times daily.      topiramate (TOPAMAX) 25 MG tablet Take 25 mg by mouth 2 (two) times daily.       No facility-administered encounter medications on file as of 8/4/2025.

## 2025-08-12 DIAGNOSIS — G56.03 BILATERAL CARPAL TUNNEL SYNDROME: Primary | ICD-10-CM

## 2025-08-12 RX ORDER — DICLOFENAC SODIUM 75 MG/1
75 TABLET, DELAYED RELEASE ORAL 2 TIMES DAILY
Qty: 20 TABLET | Refills: 0 | Status: SHIPPED | OUTPATIENT
Start: 2025-08-12 | End: 2025-08-22

## 2025-08-13 ENCOUNTER — OFFICE VISIT (OUTPATIENT)
Dept: ORTHOPEDICS | Facility: CLINIC | Age: 60
End: 2025-08-13
Payer: MEDICAID

## 2025-08-13 DIAGNOSIS — G56.03 BILATERAL CARPAL TUNNEL SYNDROME: ICD-10-CM

## 2025-08-13 DIAGNOSIS — M65.4 DE QUERVAIN'S TENOSYNOVITIS, LEFT: Primary | ICD-10-CM

## 2025-08-13 DIAGNOSIS — M18.12 ARTHRITIS OF CARPOMETACARPAL (CMC) JOINT OF LEFT THUMB: ICD-10-CM

## 2025-08-13 PROCEDURE — 20550 NJX 1 TENDON SHEATH/LIGAMENT: CPT | Mod: PBBFAC,LT | Performed by: ORTHOPAEDIC SURGERY

## 2025-08-13 PROCEDURE — 99999 PR PBB SHADOW E&M-EST. PATIENT-LVL III: CPT | Mod: PBBFAC,,, | Performed by: ORTHOPAEDIC SURGERY

## 2025-08-13 PROCEDURE — 99213 OFFICE O/P EST LOW 20 MIN: CPT | Mod: PBBFAC | Performed by: ORTHOPAEDIC SURGERY

## 2025-08-13 PROCEDURE — 99999PBSHW PR PBB SHADOW TECHNICAL ONLY FILED TO HB: Mod: PBBFAC,,,

## 2025-08-13 RX ORDER — BETAMETHASONE SODIUM PHOSPHATE AND BETAMETHASONE ACETATE 3; 3 MG/ML; MG/ML
6 INJECTION, SUSPENSION INTRA-ARTICULAR; INTRALESIONAL; INTRAMUSCULAR; SOFT TISSUE
Status: DISCONTINUED | OUTPATIENT
Start: 2025-08-13 | End: 2025-08-13 | Stop reason: HOSPADM

## 2025-08-13 RX ADMIN — BETAMETHASONE ACETATE AND BETAMETHASONE SODIUM PHOSPHATE 6 MG: 3; 3 INJECTION, SUSPENSION INTRA-ARTICULAR; INTRALESIONAL; INTRAMUSCULAR; SOFT TISSUE at 11:08
